# Patient Record
Sex: MALE | Race: WHITE | NOT HISPANIC OR LATINO | Employment: OTHER | URBAN - METROPOLITAN AREA
[De-identification: names, ages, dates, MRNs, and addresses within clinical notes are randomized per-mention and may not be internally consistent; named-entity substitution may affect disease eponyms.]

---

## 2017-12-11 ENCOUNTER — OFFICE VISIT (OUTPATIENT)
Dept: ORTHOPEDIC SURGERY | Facility: CLINIC | Age: 72
End: 2017-12-11

## 2017-12-11 VITALS
HEART RATE: 72 BPM | BODY MASS INDEX: 27.78 KG/M2 | HEIGHT: 71 IN | WEIGHT: 198.41 LBS | SYSTOLIC BLOOD PRESSURE: 127 MMHG | DIASTOLIC BLOOD PRESSURE: 80 MMHG

## 2017-12-11 DIAGNOSIS — S83.412A SPRAIN OF MEDIAL COLLATERAL LIGAMENT OF LEFT KNEE, INITIAL ENCOUNTER: ICD-10-CM

## 2017-12-11 DIAGNOSIS — R52 PAIN: Primary | ICD-10-CM

## 2017-12-11 DIAGNOSIS — M17.32 POST-TRAUMATIC OSTEOARTHRITIS OF LEFT KNEE: ICD-10-CM

## 2017-12-11 DIAGNOSIS — S46.012A RUPTURE OF LEFT SUPRASPINATUS TENDON, INITIAL ENCOUNTER: ICD-10-CM

## 2017-12-11 PROCEDURE — 99204 OFFICE O/P NEW MOD 45 MIN: CPT | Performed by: ORTHOPAEDIC SURGERY

## 2017-12-11 RX ORDER — LISINOPRIL AND HYDROCHLOROTHIAZIDE 12.5; 1 MG/1; MG/1
TABLET ORAL
Refills: 0 | COMMUNITY
Start: 2017-09-19 | End: 2020-09-01

## 2017-12-11 RX ORDER — TRAZODONE HYDROCHLORIDE 100 MG/1
300 TABLET ORAL NIGHTLY
Refills: 1 | COMMUNITY
Start: 2017-11-27

## 2017-12-11 RX ORDER — ACYCLOVIR 200 MG/1
CAPSULE ORAL
Refills: 2 | COMMUNITY
Start: 2017-11-27 | End: 2020-03-20

## 2017-12-11 NOTE — PROGRESS NOTES
[facility] [place] [facility] [place]    Subjective     Chief Complaint   Patient presents with   • Left Shoulder - Pain        HPI      Nicholas Post is a 72 y.o. male.  He complains of left shoulder and left knee pain.  Left shoulder is her for years.  He had an MRI 3 years ago until we had a rotator cuff tear.  Pain 7-8 out of 10.  It is dull aching throbbing stabbing.  It is worse with motion and better with rest.  His left knee was injured a month ago after a skydiving accident he landed and hurt his knee.  His knee feels loose and stable he denies any previous injury to the knee.    Past Medical History:   Diagnosis Date   • Osteoarthritis       Past Surgical History:   Procedure Laterality Date   • BACK SURGERY      Lumbar   • KNEE SURGERY Left     Arthroscopy      Family History   Problem Relation Age of Onset   • Cancer Mother    • Stroke Mother    • Heart attack Father      Social History     Social History   • Marital status:      Spouse name: N/A   • Number of children: N/A   • Years of education: N/A     Occupational History   • Not on file.     Social History Main Topics   • Smoking status: Never Smoker   • Smokeless tobacco: Never Used   • Alcohol use No   • Drug use: Yes     Special: Marijuana      Comment: occasional   • Sexual activity: Defer     Other Topics Concern   • Not on file     Social History Narrative   • No narrative on file      No current outpatient prescriptions on file prior to visit.     No current facility-administered medications on file prior to visit.       No Known Allergies     The following portions of the patient's history were reviewed and updated as appropriate: allergies, current medications, past family history, past medical history, past social history, past surgical history and problem list.    Review of Systems   Constitutional: Positive for activity change.   HENT: Positive for hearing loss, sinus pressure and tinnitus.    Eyes: Positive for pain.  "  Respiratory: Negative.    Cardiovascular: Negative.    Gastrointestinal: Negative.    Endocrine: Negative.    Genitourinary: Negative.    Musculoskeletal: Positive for arthralgias, back pain, gait problem and joint swelling.   Skin: Negative.    Allergic/Immunologic: Negative.    Neurological: Positive for weakness.   Hematological: Negative.    Psychiatric/Behavioral: The patient is hyperactive.         Objective      Physical Exam  /80  Pulse 72  Ht 181 cm (71.26\")  Wt 90 kg (198 lb 6.6 oz)  BMI 27.47 kg/m2    Body mass index is 27.47 kg/(m^2).        GENERAL APPEARANCE: awake, alert & oriented x 3, in no acute distress and well developed, well nourished  PSYCH: normal mood andaffect  LUNGS:  breathing nonlabored, no wheezing  EYES: sclera anicteric, pupils equal  CARDIOVASCULAR: palpable pulses dorsalis pedis, palpable posterior tibial bilaterally. Capillary refill less than 2 seconds  INTEGUMENTARY: skin intact, no clubbing, cyanosis  NEUROLOGIC:  Normal gait and balance            Ortho Exam  Musculoskeletal   Upper Extremity   Left Shoulder     Inspection and Palpation:     Tenderness - none     Crepitus - none    Sensation is normal    Examination reveals no ecchymosis.      Strength and Tone:    Supraspinatus - 5/5    Infraspinatus - 5/5    Subscapularis - 5/5    Deltoid - 5/5   Range of Motion   Right shoulder:    Internal Rotation: ROM - T7    External Rotation: AROM - 80 degrees    Elevation through flexion: AROM - 180 degrees     Abduction - 180   Left Shoulder:    Internal Rotation: ROM - T7    External Rotation: AROM - 80 degrees    Elevation through flexion: AROM - 180 degrees     Abduction - 180 degrees   Instability   Left shoulder    Sulcus sign negative    Apprehension test negative    Maik relocation test negative    Jerk test negative   Impingement   Left shoulder    Ramos impingement test positive    Neer impingement test positive   Functional Testing   Left shoulder    AC " crossover adduction test negative    Abdominal compression test negative    Lift-off sign negative    Speed's test negative    Valdez's test negative    Horriblower's sign negative Peripheral Vascular:    Upper Extremity:   Inspection:  Left--no cyanotic nail beds Right--no cyanotic nail beds   Bilateral:  Pink nail beds with brisk capillary refill   Palpation:  Bilateral radial pulse normal    Musculoskeletal:  Global Assessment:  Overall assessment of Lower Extremity Muscle Strength and Tone:  Left quadriceps--5/5   Left hamstrings--5/5       Left tibialis anterior--5/5  Left gastroc-soleus--5/5  Left EHL--5/5      Lower Extremity:  Knee/Patella:  No digital clubbing or cyanosis.    Examination of left knee reveals:  Normal deep tendon reflexes, coordination, strength, tone, sensation.  No known fractures or deformities.    Inspection and Palpation:    Left knee:  Tenderness:  none  Effusion:  none  Crepitus:  none  Pulses:  2+  Ecchymosis:  None  Warmth:  None       ROM:  Right:  Extension:0    Flexion:135  Left:  Extension:0     Flexion:135    Instability:    Left:  Lachman Test:  positive, Varus stress test negative, Valgus stress test positive   Anterior Drawer Test:  Negative, Posterior Drawer Test:  Negative      Deformities/Malalignments/Discrepancies:    Left:  none  Right:  none    Functional Testing:    Left:  Federico's test:  Negative  Patella grind test:  Negative  Q-angle:  Normal  Apprehension Sign:  Negative    Imaging/Studies  Imaging Results (last 24 hours)     Procedure Component Value Units Date/Time    XR Shoulder 2+ View Left [[id number]] Resulted:  12/11/17 1022     Updated:  12/11/17 1022    Narrative:       Left Shoulder X-Ray  Indication: Pain  AP, scapular Y, and axillary lateral views    Findings:  No fracture  No bony lesion  Normal soft tissues  Normal joint spaces    No prior studies were available for comparison.        XR Knee 4+ View Left [[id number]] Resulted:  12/11/17 1022      Updated:  12/11/17 1023    Narrative:       Knee X-Ray  Indication: Pain    Upright AP of bilateral knees. Lateral, skiers and Sunrise views of left   knee     Findings: Significant heterotopic ossification about the MCL off the   medial femoral condyle.  Bone-on-bone and lateral compartment.  With   significant degenerative changes  No fracture  Normal soft tissues  Normal joint spaces    No prior studies were available for comparison.              Assessment/Plan      Nicholas was seen today for pain.    Diagnoses and all orders for this visit:    Pain  -     XR Shoulder 2+ View Left  -     XR Knee 4+ View Left  -     Ambulatory Referral to Physical Therapy    Post-traumatic osteoarthritis of left knee    Sprain of medial collateral ligament of left knee, initial encounter    Rupture of left supraspinatus tendon, initial encounter        I'll get an MRI left shoulder.  He will continue his brace left knee and we will do physical therapy left knee.  He'll follow-up after MRI left shoulder to evaluate his rotator cuff tear.    Medical Decision Making  Management Options : over-the-counter medicine and physical/occupational therapy  Data/Risk: radiology tests and independent visualization of imaging, lab tests, or EMG/NCV    Stephan Amado MD  12/11/17  10:35 AM

## 2017-12-20 DIAGNOSIS — M75.102 SUPRASPINATUS SYNDROME OF LEFT SHOULDER: Primary | ICD-10-CM

## 2017-12-22 ENCOUNTER — HOSPITAL ENCOUNTER (OUTPATIENT)
Dept: MRI IMAGING | Facility: HOSPITAL | Age: 72
Discharge: HOME | End: 2017-12-22
Attending: ORTHOPAEDIC SURGERY | Admitting: ORTHOPAEDIC SURGERY

## 2017-12-22 DIAGNOSIS — M75.102 SUPRASPINATUS SYNDROME OF LEFT SHOULDER: ICD-10-CM

## 2017-12-22 PROCEDURE — 73221 MRI JOINT UPR EXTREM W/O DYE: CPT

## 2018-01-03 ENCOUNTER — OFFICE VISIT (OUTPATIENT)
Dept: ORTHOPEDIC SURGERY | Facility: CLINIC | Age: 73
End: 2018-01-03

## 2018-01-03 VITALS
BODY MASS INDEX: 27.78 KG/M2 | HEART RATE: 72 BPM | SYSTOLIC BLOOD PRESSURE: 132 MMHG | HEIGHT: 71 IN | WEIGHT: 198.41 LBS | DIASTOLIC BLOOD PRESSURE: 95 MMHG

## 2018-01-03 DIAGNOSIS — S46.012D RUPTURE OF LEFT SUPRASPINATUS TENDON, SUBSEQUENT ENCOUNTER: ICD-10-CM

## 2018-01-03 DIAGNOSIS — M17.32 POST-TRAUMATIC OSTEOARTHRITIS OF LEFT KNEE: Primary | ICD-10-CM

## 2018-01-03 DIAGNOSIS — S83.412D SPRAIN OF MEDIAL COLLATERAL LIGAMENT OF LEFT KNEE, SUBSEQUENT ENCOUNTER: ICD-10-CM

## 2018-01-03 PROCEDURE — 99214 OFFICE O/P EST MOD 30 MIN: CPT | Performed by: ORTHOPAEDIC SURGERY

## 2018-01-03 RX ORDER — DEXTROAMPHETAMINE SACCHARATE, AMPHETAMINE ASPARTATE, DEXTROAMPHETAMINE SULFATE AND AMPHETAMINE SULFATE 5; 5; 5; 5 MG/1; MG/1; MG/1; MG/1
TABLET ORAL
Refills: 0 | COMMUNITY
Start: 2017-12-11 | End: 2020-03-20

## 2018-01-03 NOTE — PROGRESS NOTES
[facility] – [place] [facility] Note    Subjective     Chief Complaint   Patient presents with   • Left Shoulder - Follow-up        HPI      Nicholas Post is a 72 y.o. male.  He is follow-up after MRI left shoulder. He complains of left shoulder and left knee pain.  Left shoulder has hurt for years.  He had an MRI 3 years ago until we had a rotator cuff tear.  Pain 7-8 out of 10.  It is dull aching throbbing stabbing.  It is worse with motion and better with rest.  His left knee was injured a month ago after a skydiving accident he landed and hurt his knee.  His knee feels loose and unstable he denies any previous injury to the knee.  He says his knee is not getting better with the brace he was given last visit.        Past Medical History:   Diagnosis Date   • Osteoarthritis       Past Surgical History:   Procedure Laterality Date   • BACK SURGERY      Lumbar   • KNEE SURGERY Left     Arthroscopy      Family History   Problem Relation Age of Onset   • Cancer Mother    • Stroke Mother    • Heart attack Father      Social History     Social History   • Marital status:      Spouse name: N/A   • Number of children: N/A   • Years of education: N/A     Occupational History   • Not on file.     Social History Main Topics   • Smoking status: Never Smoker   • Smokeless tobacco: Never Used   • Alcohol use No   • Drug use: Yes     Special: Marijuana      Comment: occasional   • Sexual activity: Defer     Other Topics Concern   • Not on file     Social History Narrative      Current Outpatient Prescriptions on File Prior to Visit   Medication Sig Dispense Refill   • acyclovir (ZOVIRAX) 200 MG capsule TK 1 C PO 3 TO 5 TIMES A DAY PRN  2   • lisinopril-hydrochlorothiazide (PRINZIDE,ZESTORETIC) 10-12.5 MG per tablet TK 1 T PO QD  0   • traZODone (DESYREL) 100 MG tablet TK 1 TO 2 TS PO HS  1     No current facility-administered medications on file prior to visit.       No Known Allergies     The following portions  "of the patient's history were reviewed and updated as appropriate: allergies, current medications, past family history, past medical history, past social history, past surgical history and problem list.    Review of Systems   Constitutional: Negative.    HENT: Negative.    Eyes: Negative.    Respiratory: Negative.    Cardiovascular: Negative.    Gastrointestinal: Negative.    Endocrine: Negative.    Genitourinary: Negative.    Musculoskeletal: Positive for arthralgias.   Skin: Negative.    Allergic/Immunologic: Negative.    Neurological: Negative.    Hematological: Negative.    Psychiatric/Behavioral: Negative.         Objective      Physical Exam  /95  Pulse 72  Ht 181 cm (71.26\")  Wt 90 kg (198 lb 6.6 oz)  BMI 27.47 kg/m2    Body mass index is 27.47 kg/(m^2).        GENERAL APPEARANCE: awake, alert & oriented x 3, in no acute distress and well developed, well nourished  PSYCH: normal mood andaffect  LUNGS:  breathing nonlabored, no wheezing  EYES: sclera anicteric, pupils equal  CARDIOVASCULAR: palpable pulses dorsalis pedis, palpable posterior tibial bilaterally. Capillary refill less than 2 seconds  INTEGUMENTARY: skin intact, no clubbing, cyanosis  NEUROLOGIC:  Normal gait and balance            Ortho Exam  Musculoskeletal   Upper Extremity   Left Shoulder     Inspection and Palpation:     Tenderness - none     Crepitus - none    Sensation is normal    Examination reveals no ecchymosis.      Strength and Tone:    Supraspinatus - 5/5    Infraspinatus - 5/5    Subscapularis - 5/5    Deltoid - 5/5   Range of Motion   Right shoulder:    Internal Rotation: ROM - T7    External Rotation: AROM - 80 degrees    Elevation through flexion: AROM - 180 degrees     Abduction - 180   Left Shoulder:    Internal Rotation: ROM - T7    External Rotation: AROM - 80 degrees    Elevation through flexion: AROM - 180 degrees     Abduction - 180 degrees   Instability   Left shoulder    Sulcus sign negative    Apprehension " test negative    Maik relocation test negative    Jerk test negative   Impingement   Left shoulder    Ramos impingement test positive    Neer impingement test positive   Functional Testing   Left shoulder    AC crossover adduction test negative    Abdominal compression test negative    Lift-off sign negative    Speed's test negative    Valdez's test negative    Horriblower's sign negative Peripheral Vascular:    Upper Extremity:   Inspection:  Left--no cyanotic nail beds Right--no cyanotic nail beds   Bilateral:  Pink nail beds with brisk capillary refill   Palpation:  Bilateral radial pulse normal    Musculoskeletal:  Global Assessment:  Overall assessment of Lower Extremity Muscle Strength and Tone:  Left quadriceps--5/5   Left hamstrings--5/5       Left tibialis anterior--5/5  Left gastroc-soleus--5/5  Left EHL--5/5      Lower Extremity:  Knee/Patella:  No digital clubbing or cyanosis.    Examination of left knee reveals:  Normal deep tendon reflexes, coordination, strength, tone, sensation.  No known fractures or deformities.    Inspection and Palpation:    Left knee:  Tenderness:  none  Effusion:  none  Crepitus:  none  Pulses:  2+  Ecchymosis:  None  Warmth:  None       ROM:  Right:  Extension:0    Flexion:135  Left:  Extension:0     Flexion:135    Instability:    Left:  Lachman Test:  Negative, Varus stress test negative, Valgus stress test positive   Anterior Drawer Test:  Negative, Posterior Drawer Test:  Negative      Deformities/Malalignments/Discrepancies:    Left:  none  Right:  none    Functional Testing:    Left:  Federico's test:  Negative  Patella grind test:  Negative  Q-angle:  Normal  Apprehension Sign:  Negative    Imaging/Studies  Imaging Results (last 24 hours)     ** No results found for the last 24 hours. **        I reviewed his MRI which shows a massive retracted tear of the supraspinatus tendon.  He also has arthritis and before meals joint arthropathy.  Assessment/Plan      Nicholas was  seen today for follow-up.    Diagnoses and all orders for this visit:    Post-traumatic osteoarthritis of left knee  -     MRI Knee Left Without Contrast; Future    Rupture of left supraspinatus tendon, subsequent encounter  -     MRI Knee Left Without Contrast; Future    Sprain of medial collateral ligament of left knee, subsequent encounter  -     MRI Knee Left Without Contrast; Future      I recommend left shoulder arthroscopy with rotator cuff repair.  Treatment options and alternatives were discussed with patient.  Surgical versus non surgical treatment options were discussed as well.    We reviewed the nature of the planned procedure including the risks, benefits and potential complications.  Understanding was expressed and the decision was made to proceed with surgery.    I will get an MRI of the left knee to evaluate that and he will continue his brace in the meantime.  Depending on what the knee MRI shows he may want to get surgery before the shoulder surgery.  I will try to see him back after the knee MRI before surgery on the shoulder.  Medical Decision Making  Management Options : over-the-counter medicine and major surgery with risk factors  Data/Risk: radiology tests and independent visualization of imaging, lab tests, or EMG/NCV    Stephan Amado MD  01/03/18  9:18 AM

## 2018-01-08 ENCOUNTER — HOSPITAL ENCOUNTER (OUTPATIENT)
Dept: MRI IMAGING | Facility: HOSPITAL | Age: 73
Discharge: HOME | End: 2018-01-08
Attending: ORTHOPAEDIC SURGERY | Admitting: ORTHOPAEDIC SURGERY

## 2018-01-08 DIAGNOSIS — M17.32 POST-TRAUMATIC OSTEOARTHRITIS OF LEFT KNEE: ICD-10-CM

## 2018-01-08 DIAGNOSIS — S46.012D RUPTURE OF LEFT SUPRASPINATUS TENDON, SUBSEQUENT ENCOUNTER: ICD-10-CM

## 2018-01-08 DIAGNOSIS — S83.412D SPRAIN OF MEDIAL COLLATERAL LIGAMENT OF LEFT KNEE, SUBSEQUENT ENCOUNTER: ICD-10-CM

## 2018-01-08 PROCEDURE — 73721 MRI JNT OF LWR EXTRE W/O DYE: CPT

## 2018-01-10 ENCOUNTER — OFFICE VISIT (OUTPATIENT)
Dept: ORTHOPEDIC SURGERY | Facility: CLINIC | Age: 73
End: 2018-01-10

## 2018-01-10 VITALS
HEIGHT: 71 IN | DIASTOLIC BLOOD PRESSURE: 85 MMHG | WEIGHT: 191.8 LBS | HEART RATE: 82 BPM | SYSTOLIC BLOOD PRESSURE: 138 MMHG | BODY MASS INDEX: 26.85 KG/M2

## 2018-01-10 DIAGNOSIS — M17.32 POST-TRAUMATIC OSTEOARTHRITIS OF LEFT KNEE: ICD-10-CM

## 2018-01-10 DIAGNOSIS — M17.12 PRIMARY OSTEOARTHRITIS OF LEFT KNEE: Primary | ICD-10-CM

## 2018-01-10 DIAGNOSIS — S46.012D RUPTURE OF LEFT SUPRASPINATUS TENDON, SUBSEQUENT ENCOUNTER: ICD-10-CM

## 2018-01-10 PROCEDURE — 99213 OFFICE O/P EST LOW 20 MIN: CPT | Performed by: ORTHOPAEDIC SURGERY

## 2018-01-10 PROCEDURE — 20610 DRAIN/INJ JOINT/BURSA W/O US: CPT | Performed by: ORTHOPAEDIC SURGERY

## 2018-01-10 RX ORDER — BETAMETHASONE SODIUM PHOSPHATE AND BETAMETHASONE ACETATE 3; 3 MG/ML; MG/ML
6 INJECTION, SUSPENSION INTRA-ARTICULAR; INTRALESIONAL; INTRAMUSCULAR; SOFT TISSUE
Status: COMPLETED | OUTPATIENT
Start: 2018-01-10 | End: 2018-01-10

## 2018-01-10 RX ORDER — LIDOCAINE HYDROCHLORIDE 10 MG/ML
4 INJECTION, SOLUTION INFILTRATION; PERINEURAL
Status: COMPLETED | OUTPATIENT
Start: 2018-01-10 | End: 2018-01-10

## 2018-01-10 RX ADMIN — BETAMETHASONE SODIUM PHOSPHATE AND BETAMETHASONE ACETATE 6 MG: 3; 3 INJECTION, SUSPENSION INTRA-ARTICULAR; INTRALESIONAL; INTRAMUSCULAR; SOFT TISSUE at 16:41

## 2018-01-10 RX ADMIN — LIDOCAINE HYDROCHLORIDE 4 ML: 10 INJECTION, SOLUTION INFILTRATION; PERINEURAL at 16:41

## 2018-01-10 NOTE — PROGRESS NOTES
Procedure   Large Joint Arthrocentesis  Date/Time: 1/10/2018 4:41 PM  Consent given by: patient  Site marked: site marked  Timeout: Immediately prior to procedure a time out was called to verify the correct patient, procedure, equipment, support staff and site/side marked as required   Supporting Documentation  Indications: pain   Procedure Details  Location: knee - L knee  Needle size: 22 G  Approach: anterolateral (4cc Bupivacaine .25%  NDC [id number]  [facility] [id number]  [id number])  Medications administered: 4 mL lidocaine 1 %; 6 mg betamethasone acetate-betamethasone sodium phosphate 6 (3-3) MG/ML  Patient tolerance: patient tolerated the procedure well with no immediate complications

## 2018-01-10 NOTE — PROGRESS NOTES
[facility] [place] [facility] Note    Subjective     Chief Complaint   Patient presents with   • Left Knee - Follow-up     1 week follow up, MRI Results        HPI      Nicholas Post is a 72 y.o. male.  He states his knee hurts worse than his shoulder.  He went to a stem cell presentation last night and had a lot of questions about that.  His pain is 6 out of 10 and stabbing in the knee.  He's had Synvisc injections but no steroid injections.        Past Medical History:   Diagnosis Date   • Osteoarthritis       Past Surgical History:   Procedure Laterality Date   • BACK SURGERY      Lumbar   • KNEE SURGERY Left     Arthroscopy      Family History   Problem Relation Age of Onset   • Cancer Mother    • Stroke Mother    • Heart attack Father      Social History     Social History   • Marital status:      Spouse name: N/A   • Number of children: N/A   • Years of education: N/A     Occupational History   • Not on file.     Social History Main Topics   • Smoking status: Never Smoker   • Smokeless tobacco: Never Used   • Alcohol use No   • Drug use: Yes     Special: Marijuana      Comment: occasional   • Sexual activity: Defer     Other Topics Concern   • Not on file     Social History Narrative      Current Outpatient Prescriptions on File Prior to Visit   Medication Sig Dispense Refill   • acyclovir (ZOVIRAX) 200 MG capsule TK 1 C PO 3 TO 5 TIMES A DAY PRN  2   • amphetamine-dextroamphetamine (ADDERALL) 20 MG tablet TK 1 T PO TID  0   • lisinopril-hydrochlorothiazide (PRINZIDE,ZESTORETIC) 10-12.5 MG per tablet TK 1 T PO QD  0   • traZODone (DESYREL) 100 MG tablet TK 1 TO 2 TS PO HS  1     No current facility-administered medications on file prior to visit.       No Known Allergies     The following portions of the patient's history were reviewed and updated as appropriate: allergies, current medications, past family history, past medical history, past social history, past surgical history and problem  "list.    Review of Systems   Constitutional: Negative.    HENT: Negative.    Eyes: Negative.    Respiratory: Negative.    Cardiovascular: Negative.    Gastrointestinal: Negative.    Endocrine: Negative.    Genitourinary: Negative.    Musculoskeletal: Positive for arthralgias.   Skin: Negative.    Allergic/Immunologic: Negative.    Neurological: Negative.    Hematological: Negative.    Psychiatric/Behavioral: Negative.         Objective      Physical Exam  /85  Pulse 82  Ht 181 cm (71.26\")  Wt 87 kg (191 lb 12.8 oz)  BMI 26.56 kg/m2    Body mass index is 26.56 kg/(m^2).        GENERAL APPEARANCE: awake, alert & oriented x 3, in no acute distress and well developed, well nourished  PSYCH: normal mood andaffect  LUNGS:  breathing nonlabored, no wheezing  EYES: sclera anicteric, pupils equal  CARDIOVASCULAR: palpable pulses dorsalis pedis, palpable posterior tibial bilaterally. Capillary refill less than 2 seconds  INTEGUMENTARY: skin intact, no clubbing, cyanosis  NEUROLOGIC:  Normal gait and balance            Ortho Exam  Peripheral Vascular:    Upper Extremity:   Inspection:  Left--no cyanotic nail beds Right--no cyanotic nail beds   Bilateral:  Pink nail beds with brisk capillary refill   Palpation:  Bilateral radial pulse normal    Musculoskeletal:  Global Assessment:  Overall assessment of Lower Extremity Muscle Strength and Tone:  Left quadriceps--5/5   Left hamstrings--5/5       Left tibialis anterior--5/5  Left gastroc-soleus--5/5  Left EHL --5/5    Lower Extremity:  Knee/Patella:  No digital clubbing or cyanosis.    Examination of left knee reveals:  Normal deep tendon reflexes, coordination, strength, tone, sensation.  No known fractures or deformities.    Inspection and Palpation:  Left knee:  Tenderness:  Over the medial joint line and moderate severity  Effusion:  none  Crepitus:  Positive  Pulses:  2+  Ecchymosis:  None  Warmth:  None     ROM:  Right:  Extension:5    Flexion:120  Left:  " Extension:5     Flexion:120    Instability:    Left:  Lachman Test:  Negative, Varus stress test negative, Valgus stress test negative    Deformities/Malalignments/Discrepancies:    Left:  Genu Varum   Right:  No deformity    Functional Testing:  Federico's test:  Negative  Patella grind test:  Positive  Q-angle:  normalMusculoskeletal   Upper Extremity   Left Shoulder     Inspection and Palpation:     Tenderness - none     Crepitus - none    Sensation is normal    Examination reveals no ecchymosis.      Strength and Tone:    Supraspinatus - 5/5    Infraspinatus - 5/5    Subscapularis - 5/5    Deltoid - 5/5   Range of Motion   Right shoulder:    Internal Rotation: ROM - T7    External Rotation: AROM - 80 degrees    Elevation through flexion: AROM - 180 degrees     Abduction - 180   Left Shoulder:    Internal Rotation: ROM - T7    External Rotation: AROM - 80 degrees    Elevation through flexion: AROM - 180 degrees     Abduction - 180 degrees   Instability   Left shoulder    Sulcus sign negative    Apprehension test negative    Maik relocation test negative    Jerk test negative   Impingement   Left shoulder    Ramos impingement test positive    Neer impingement test positive   Functional Testing   Left shoulder    AC crossover adduction test negative    Abdominal compression test negative    Lift-off sign negative    Speed's test negative    Valdez's test negative    Horriblower's sign negative     Imaging/Studies  Imaging Results (last 24 hours)     ** No results found for the last 24 hours. **        I reviewed the MRI left knee which show severe arthritis and chronic ACL tear no acute pathology  Assessment/Plan      Nicholas was seen today for follow-up.    Diagnoses and all orders for this visit:    Primary osteoarthritis of left knee  -     Large Joint Arthrocentesis    Post-traumatic osteoarthritis of left knee    Rupture of left supraspinatus tendon, subsequent encounter      I gave him a steroid injection  left knee today.  He tolerated the injection well.convocation.  He'll follow-up in 3 weeks.  If not better consider total knee arthroplasty.  Eventually he will need a left shoulder rotator cuff repair.    Medical Decision Making  Management Options : over-the-counter medicine and prescription/IM medicine  Data/Risk: radiology tests and independent visualization of imaging, lab tests, or EMG/NCV    Stephan Amado MD  01/10/18  4:53 PM

## 2018-01-22 ENCOUNTER — TELEPHONE (OUTPATIENT)
Dept: ORTHOPEDIC SURGERY | Facility: CLINIC | Age: 73
End: 2018-01-22

## 2018-01-23 ENCOUNTER — TELEPHONE (OUTPATIENT)
Dept: ORTHOPEDIC SURGERY | Facility: CLINIC | Age: 73
End: 2018-01-23

## 2018-01-23 NOTE — TELEPHONE ENCOUNTER
----- Message from Racquel Lee sent at 1/19/2018  2:13 PM EST -----  Regarding: R/S SURGERY  Patient would like to r/s his surgery sometime after 2/14. Please return his call at [phone]. Thanks!

## 2018-02-15 ENCOUNTER — OUTSIDE FACILITY SERVICE (OUTPATIENT)
Dept: ORTHOPEDIC SURGERY | Facility: CLINIC | Age: 73
End: 2018-02-15

## 2018-02-15 PROCEDURE — 29828 SHO ARTHRS SRG BICP TENODSIS: CPT | Performed by: ORTHOPAEDIC SURGERY

## 2018-02-15 PROCEDURE — 29827 SHO ARTHRS SRG RT8TR CUF RPR: CPT | Performed by: ORTHOPAEDIC SURGERY

## 2018-02-21 ENCOUNTER — OFFICE VISIT (OUTPATIENT)
Dept: ORTHOPEDIC SURGERY | Facility: CLINIC | Age: 73
End: 2018-02-21

## 2018-02-21 DIAGNOSIS — Z98.890 STATUS POST ARTHROSCOPY OF SHOULDER: ICD-10-CM

## 2018-02-21 DIAGNOSIS — S46.012D RUPTURE OF LEFT SUPRASPINATUS TENDON, SUBSEQUENT ENCOUNTER: Primary | ICD-10-CM

## 2018-02-21 PROCEDURE — 99024 POSTOP FOLLOW-UP VISIT: CPT | Performed by: ORTHOPAEDIC SURGERY

## 2018-02-21 RX ORDER — HYDROCODONE BITARTRATE AND ACETAMINOPHEN 7.5; 325 MG/1; MG/1
TABLET ORAL
Refills: 0 | COMMUNITY
Start: 2018-02-15 | End: 2020-03-20

## 2018-02-21 RX ORDER — PROMETHAZINE HYDROCHLORIDE 25 MG/1
TABLET ORAL
Refills: 0 | COMMUNITY
Start: 2018-02-15 | End: 2020-03-20

## 2018-02-21 NOTE — PROGRESS NOTES
Chief Complaint   Patient presents with   • Post-op     1 week s/p (L) shoulder arthroscopy with RCR, biceps tenodesis           HPI    He is doing well no complaints.    There were no vitals filed for this visit.      Physical Exam:  His shoulder portals look good he is neurovascular intact.      Nicholas was seen today for post-op.    Diagnoses and all orders for this visit:    Rupture of left supraspinatus tendon, subsequent encounter    Status post arthroscopy of shoulder    He will follow-up in 3 weeks to start physical therapy.

## 2018-03-16 ENCOUNTER — OFFICE VISIT (OUTPATIENT)
Dept: ORTHOPEDIC SURGERY | Facility: CLINIC | Age: 73
End: 2018-03-16

## 2018-03-16 DIAGNOSIS — Z98.890 STATUS POST LEFT ROTATOR CUFF REPAIR: Primary | ICD-10-CM

## 2018-03-16 DIAGNOSIS — Z91.199 NONCOMPLIANCE: ICD-10-CM

## 2018-03-16 PROCEDURE — 99024 POSTOP FOLLOW-UP VISIT: CPT | Performed by: ORTHOPAEDIC SURGERY

## 2018-03-16 NOTE — PROGRESS NOTES
Chief Complaint   Patient presents with   • Post-op Follow-up     4 weeks s/p (L) shoulder arthroscopy with RCR, biceps tenodesis 02/16/2018           HPI    He is follow-up rotator cuff repair.  States he has pain.  He is very re-tore something.  He is not wearing his sling.  He drove following [place] and back.  It appears he has not been compliant with using his sling and has been trying to her shoulder.    There were no vitals filed for this visit.      Physical Exam:  His left shoulder looks good with no erythema.  There is minimal swelling.  He is neurovascular intact.        ICD-10-CM ICD-9-CM   1. Status post left rotator cuff repair Z98.890 V45.89   2. Noncompliance Z91.19 V15.81     I have ordered physical therapy.  He needs start range of motion.  No strengthening.  He'll follow-up in one month.

## 2018-03-22 ENCOUNTER — TELEPHONE (OUTPATIENT)
Dept: ORTHOPEDIC SURGERY | Facility: CLINIC | Age: 73
End: 2018-03-22

## 2018-03-22 NOTE — TELEPHONE ENCOUNTER
He is 5 weeks out left shoulder arthroscopy.  Refill pain meds? Send in anti-inflammatory?  Please advise! Thanks!  Melanie

## 2018-03-22 NOTE — TELEPHONE ENCOUNTER
PT IS ASKING IF AN ANTI INFLAMMATORY CAN BE SENT TO [facility] IN [place] FOR HIS KNEE?     HE'S ALSO ASKING FOR A WEEK SUPPLY OF PAIN MEDICATION FOR HIS SHOULDER.

## 2018-03-23 RX ORDER — NAPROXEN 500 MG/1
500 TABLET ORAL 2 TIMES DAILY
Qty: 180 TABLET | Refills: 3 | Status: SHIPPED | OUTPATIENT
Start: 2018-03-23 | End: 2020-03-20

## 2018-03-27 ENCOUNTER — HOSPITAL ENCOUNTER (OUTPATIENT)
Dept: PHYSICAL THERAPY | Facility: HOSPITAL | Age: 73
Setting detail: THERAPIES SERIES
Discharge: HOME | End: 2018-03-27

## 2018-03-27 DIAGNOSIS — R68.89 IMPAIRED FUNCTION OF UPPER EXTREMITY: ICD-10-CM

## 2018-03-27 DIAGNOSIS — Z98.890 S/P LEFT ROTATOR CUFF REPAIR: Primary | ICD-10-CM

## 2018-03-27 PROCEDURE — G8985 CARRY GOAL STATUS: HCPCS | Performed by: PHYSICAL THERAPIST

## 2018-03-27 PROCEDURE — G8984 CARRY CURRENT STATUS: HCPCS | Performed by: PHYSICAL THERAPIST

## 2018-03-27 PROCEDURE — 97161 PT EVAL LOW COMPLEX 20 MIN: CPT | Performed by: PHYSICAL THERAPIST

## 2018-03-27 NOTE — THERAPY EVALUATION
"    Outpatient Physical Therapy Ortho Initial Evaluation  [facility]     Patient Name: Nicholas Post  : 1945  MRN: [id number]  Today's Date: 3/27/2018      Visit Date: 2018    There is no problem list on file for this patient.       Past Medical History:   Diagnosis Date   • Osteoarthritis         Past Surgical History:   Procedure Laterality Date   • BACK SURGERY      Lumbar   • KNEE SURGERY Left     Arthroscopy       Visit Dx:     ICD-10-CM ICD-9-CM   1. S/P left rotator cuff repair Z98.890 V45.89   2. Impaired function of upper extremity R68.89 V49.5             Patient History     Row Name 18 1400             History    Chief Complaint Difficulty with daily activities;Muscle weakness;Pain  -RB      Type of Pain Shoulder pain  -RB      Brief Description of Current Complaint Longstanding B shoulder pn. Had MRI showing RTC tear. Underwent RTC repair on 2/15/18. Says he was unable to wear his sling more than a few days 2/2 discomfort and has been using his arm a lot since surgery \"because he has had no choice.\" Reports having driven 2800 miles from [place] using his left arm and had significant pain for several weeks. Over the past few days pn seems to have eased and he is able to sleep w/ less difficulty. He continues to be limited w/ overhead reaching, lifting/carrying items, dressing, bathing, performing hygiene, etc.   -RB      Previous treatment for THIS PROBLEM Surgery  -RB      Surgery Date: 02/15/18  -RB      Current Tobacco Use None  -RB      Patient's Rating of General Health Good  -RB      Hand Dominance right-handed  -RB      Occupation/sports/leisure activities Retired, enjoys [employer].   -RB      Patient seeing anyone else for problem(s)? Yes  -RB      How has patient tried to help current problem? medication  -RB         Pain     Pain Location Shoulder  -RB      Pain at Present 4  -RB      Pain at Best 3  -RB      Pain at Worst 4  -RB      Pain Frequency Constant/continuous  " -RB      Pain Description Aching;Burning;Dull  -RB      What Performance Factors Make the Current Problem(s) WORSE? moving the shoulder, lifting/carrying  -RB      What Performance Factors Make the Current Problem(s) BETTER? rest  -RB         Fall Risk Assessment    Any falls in the past year: Yes  -RB      Number of falls reported in the last 12 months multiple  -RB      Factors that contributed to the fall: Lost balance;Tripped   L knee weakness  -RB      Does patient have a fear of falling No  -RB         Daily Activities    Primary Language English  -RB      How does patient learn best? Demonstration  -RB      Teaching needs identified Home Exercise Program;Management of Condition  -RB      Patient is concerned about/has problems with Difficulty with self care (i.e. bathing, dressing, toileting:;Grasping objects lifting;Performing home management (household chores, shopping, care of dependents);Reaching over head;Repetitive movements of the hand, arm, shoulder  -RB      Does patient have problems with the following? Depression  -RB      Barriers to learning None  -RB      Pt Participated in POC and Goals Yes  -RB         Safety    Are you being hurt, hit, or frightened by anyone at home or in your life? No  -RB      Are you being neglected by a caregiver No  -RB        User Key  (r) = Recorded By, (t) = Taken By, (c) = Cosigned By    Initials Name Provider Type    RB Gladys Maxwell, PT Physical Therapist                PT Ortho     Row Name 03/27/18 1400       Posture/Observations    Posture/Observations Comments Pt presents to PT clinic out sling, does not hesitate to attempt raising L arm overhead when describing to PT how he has used it at home.  -RB       Special Tests/Palpation    Special Tests/Palpation Shoulder  -RB       General ROM    LT Upper Ext Lt Shoulder ABduction;Lt Shoulder Extension;Lt Shoulder Flexion;Lt Shoulder External Rotation;Lt Shoulder Internal Rotation;Lt Elbow Extension/Flexion  -RB        Left Upper Ext    Lt Shoulder Abduction AROM 67  -RB    Lt Shoulder Abduction PROM 90  -RB    Lt Shoulder Extension AROM 62  -RB    Lt Shoulder Flexion AROM 82  -RB    Lt Shoulder Flexion PROM 90  -RB    Lt Shoulder External Rotation AROM 37   active assisted  -RB    Lt Shoulder External Rotation PROM 50   w/ shoulder adducted to side  -RB    Lt Shoulder Internal Rotation AROM to abdomen  -RB    Lt Shoulder Internal Rotation PROM deferred  -RB    Lt Elbow Extension/Flexion AROM 0-140  -RB    Lt Upper Extremity Comments  L wrist/forearm ROM WNL  -RB       MMT (Manual Muscle Testing)    Additional Documentation --   formal MMT deferred  -RB      User Key  (r) = Recorded By, (t) = Taken By, (c) = Cosigned By    Initials Name Provider Type    RB Gladys Maxwell, PT Physical Therapist                      Therapy Education  Education Details: Issued and practiced initial HEP including table slides into flexion and scaption, pendulums, scap retractions, and active elbow ROM to tolerance. Re-inforced importance of compliance w/ movement and activity restrictions (no active movement overhead, behind back, or out to side, no pushing/pulling, lifting/carrying) to allow for optimal healing. Educated re: use of ice for pn control.  Given: HEP, Symptoms/condition management, Pain management, Mobility training  Program: New  How Provided: Verbal, Demonstration, Written  Provided to: Patient  Level of Understanding: Teach back education performed           PT OP Goals     Row Name 03/27/18 1748 03/27/18 1700       PT Short Term Goals    STG Date to Achieve  -- 04/24/18  -RB    STG 1  -- Pt to be compliant w/ initial HEP for P/AAROM activities, low level scap stab, and pn control.  -RB    STG 1 Progress  -- New  -RB    STG 2  -- Pt to demo full PROM in all planes w/ minimal to no pn.  -RB    STG 2 Progress  -- New  -RB    STG 3  -- Pt to improve L shoulder AROM to at least 130 deg elevation, 50 deg ER.  -RB    STG 3 Progress  --  New  -RB    STG 4  -- Pt to achieve behind back IR to at least L1 or higher.  -RB    STG 4 Progress  -- New  -RB       Long Term Goals    LTG Date to Achieve  -- 05/22/18  -RB    LTG 1  -- Pt to be independen w/ long term HEP for strengthening and mobility.  -RB    LTG 1 Progress  -- New  -RB    LTG 2  -- Pt to demo functional AROM in all planes w/ minimal to no pn.  -RB    LTG 2 Progress  -- New  -RB    LTG 3  -- Pt to demo L shoulder strength of grossly 4/5 in all planes.  -RB    LTG 3 Progress  -- New  -RB    LTG 4  -- Pt to improve QD score by at least 25% to reflect improved pn and function.  -RB    LTG 4 Progress  -- New  -RB       Time Calculation    PT Goal Re-Cert Due Date 06/25/18  -RB 06/25/18  -RB      User Key  (r) = Recorded By, (t) = Taken By, (c) = Cosigned By    Initials Name Provider Type    RB Gladys Maxwell, PT Physical Therapist                PT Assessment/Plan     Row Name 03/27/18 1743          PT Assessment    Functional Limitations Decreased safety during functional activities;Limitation in home management;Limitations in functional capacity and performance;Performance in leisure activities;Performance in self-care ADL;Limitations in community activities  -RB     Impairments Impaired flexibility;Joint mobility;Pain;Muscle strength;Range of motion;Impaired muscle endurance;Impaired muscle power  -RB     Assessment Comments Pt presents approx 6 wks s/p arthroscopic rotator cuff repair and bicep tenodesis. He reports non compliance w/ ROM and activity restrictions since surgery. He demonstrates significant deficits in A/PROM, joint mobility, and function. Deferred formal strength testing today due to length of time since surgery. Pt would benefit from skilled PT services to address deficits, improve strength and mobility, and maximize function.  -RB     Please refer to paper survey for additional self-reported information Yes  -RB     Rehab Potential Fair  -RB     Patient/caregiver participated  in establishment of treatment plan and goals Yes  -RB     Patient would benefit from skilled therapy intervention Yes  -RB        PT Plan    PT Frequency 2x/week  -RB     Predicted Duration of Therapy Intervention (OT Eval) 15-20 visits  -RB     Planned CPT's? PT EVAL LOW COMPLEXITY: 89418;PT RE-EVAL: 91041;PT THER PROC EA 15 MIN: 37317;PT MANUAL THERAPY EA 15 MIN: 31519;PT NEUROMUSC RE-EDUCATION EA 15 MIN: 94732;PT HOT OR COLD PACK TREAT MCARE;PT ELECTRICAL STIM UNATTEND:   -RB     PT Plan Comments PT POC to include ROM, scap stabilization, manual therapy progressing to strengthening and functional training as per protocol.   -RB       User Key  (r) = Recorded By, (t) = Taken By, (c) = Cosigned By    Initials Name Provider Type    RB Gladys Maxwell, PT Physical Therapist                              Outcome Measure Options: Quick DASH  Quick DASH  Open a tight or new jar.: Mild Difficulty  Do heavy household chores (e.g., wash walls, wash floors): Mild Difficulty  Carry a shopping bag or briefcase: Mild Difficulty  Wash your back: Severe Difficulty  Use a knife to cut food: Mild Difficulty  Recreational activities in which you take some force or impact through your arm, should or hand (e.g. golf, hammering, tennis, etc.): Severe Difficulty  During the past week, to what extent has your arm, shoulder, or hand problem interfered with your normal social activites with family, friends, neighbors or groups?: Moderately  During the past week, were you limited in your work or other regular daily activities as a result of your arm, shoulder or hand problem?: Moderately Limited  Arm, Shoulder, or hand pain: Severe  Tingling (pins and needles) in your arm, shoulder, or hand: None  During the past week, how much difficulty have you had sleeping because of the pain in your arm, shoulder or hand?: Severe Difficulty  Number of Questions Answered: 11  Quick DASH Score: 45.45         Time Calculation:   Start Time: 7382      Therapy Charges for Today     Code Description Service Date Service Provider Modifiers Qty    26885299430 HC PT CARRY MOV HAND OBJ CURRENT 3/27/2018 Gladys Maxwell, PT GP,  1    38109673009 HC PT CARRY MOV HAND OBJ PROJECTED 3/27/2018 Gladys Maxwell, PT GP,  1    [id number] HC PT EVAL LOW COMPLEXITY 4 3/27/2018 Gladys Maxwell, PT GP 1          PT G-Codes  PT Professional Judgement Used?: Yes  Outcome Measure Options: Quick DASH  Score: 45.45%  Functional Limitation: Carrying, moving and handling objects  Carrying, Moving and Handling Objects Current Status (): At least 80 percent but less than 100 percent impaired, limited or restricted  Carrying, Moving and Handling Objects Goal Status (): At least 20 percent but less than 40 percent impaired, limited or restricted         Gladys Maxwell, PT  3/27/2018

## 2018-05-07 ENCOUNTER — DOCUMENTATION (OUTPATIENT)
Dept: PHYSICAL THERAPY | Facility: HOSPITAL | Age: 73
End: 2018-05-07

## 2018-05-07 DIAGNOSIS — Z98.890 S/P LEFT ROTATOR CUFF REPAIR: Primary | ICD-10-CM

## 2018-05-07 DIAGNOSIS — R68.89 IMPAIRED FUNCTION OF UPPER EXTREMITY: ICD-10-CM

## 2018-05-07 NOTE — THERAPY DISCHARGE NOTE
Outpatient Physical Therapy Discharge Summary         Patient Name: Nicholas Post  : 1945  MRN: [id number]    Today's Date: 2018    Visit Dx:    ICD-10-CM ICD-9-CM   1. S/P left rotator cuff repair Z98.890 V45.89   2. Impaired function of upper extremity R68.89 V49.5             PT OP Goals     Row Name 18 1600          PT Short Term Goals    STG Date to Achieve 18  -RB     STG 1 Pt to be compliant w/ initial HEP for P/AAROM activities, low level scap stab, and pn control.  -RB     STG 1 Progress Not Met  -RB     STG 2 Pt to demo full PROM in all planes w/ minimal to no pn.  -RB     STG 2 Progress Not Met  -RB     STG 3 Pt to improve L shoulder AROM to at least 130 deg elevation, 50 deg ER.  -RB     STG 3 Progress Not Met  -RB     STG 4 Pt to achieve behind back IR to at least L1 or higher.  -RB     STG 4 Progress Not Met  -RB        Long Term Goals    LTG Date to Achieve 18  -RB     LTG 1 Pt to be independen w/ long term HEP for strengthening and mobility.  -RB     LTG 1 Progress Not Met  -RB     LTG 2 Pt to demo functional AROM in all planes w/ minimal to no pn.  -RB     LTG 2 Progress Not Met  -RB     LTG 3 Pt to demo L shoulder strength of grossly 4/5 in all planes.  -RB     LTG 3 Progress Not Met  -RB     LTG 4 Pt to improve QD score by at least 25% to reflect improved pn and function.  -RB     LTG 4 Progress Not Met  -RB       User Key  (r) = Recorded By, (t) = Taken By, (c) = Cosigned By    Initials Name Provider Type    NAVARRO Maxwell, PT Physical Therapist          OP PT Discharge Summary  Date of Discharge: 18  Reason for Discharge: Non-compliant  Outcomes Achieved: Unable to make functional progress toward goals at this time  Discharge Destination: Home without follow-up  Discharge Instructions/Additional Comments: Pt presented for initial evaluation only, failed to return for follow up visits.      Time Calculation:            PT G-Codes  PT Professional  Judgement Used?: Yes  Functional Limitation: Carrying, moving and handling objects  Carrying, Moving and Handling Objects Goal Status (): At least 20 percent but less than 40 percent impaired, limited or restricted  Carrying, Moving and Handling Objects Discharge Status (): At least 80 percent but less than 100 percent impaired, limited or restricted       Gladys Maxwell, PT  5/7/2018

## 2018-06-05 ENCOUNTER — TELEPHONE (OUTPATIENT)
Dept: ORTHOPEDIC SURGERY | Facility: CLINIC | Age: 73
End: 2018-06-05

## 2018-06-05 NOTE — TELEPHONE ENCOUNTER
I CALLED PATIENT TO REMIND HIM OF HIS POST OP APPOINTMENT FOR TOMORROW AND HE STATED THAT HE CAN'T AFFORD TO COME FOR ANOTHER POST OP FOLLOW UP'S. HE ALSO STATED THAT HE IS DOING FINE AND WILL CALL IF HE NEEDS TO.

## 2018-06-06 NOTE — TELEPHONE ENCOUNTER
Does he realize that postop visits are no charge for 3 months.  I assume he means he cannot afford the transportation?

## 2020-03-20 ENCOUNTER — OFFICE VISIT (OUTPATIENT)
Dept: NEUROSURGERY | Facility: CLINIC | Age: 75
End: 2020-03-20

## 2020-03-20 VITALS — HEIGHT: 72 IN | BODY MASS INDEX: 26.22 KG/M2 | WEIGHT: 193.6 LBS | TEMPERATURE: 97.8 F

## 2020-03-20 DIAGNOSIS — M47.816 FACET ARTHRITIS OF LUMBAR REGION: ICD-10-CM

## 2020-03-20 DIAGNOSIS — M48.061 SPINAL STENOSIS OF LUMBAR REGION WITHOUT NEUROGENIC CLAUDICATION: ICD-10-CM

## 2020-03-20 DIAGNOSIS — M54.16 LUMBAR RADICULOPATHY: Primary | ICD-10-CM

## 2020-03-20 DIAGNOSIS — M43.16 SPONDYLOLISTHESIS OF LUMBAR REGION: ICD-10-CM

## 2020-03-20 DIAGNOSIS — M51.36 DEGENERATIVE DISC DISEASE, LUMBAR: ICD-10-CM

## 2020-03-20 PROCEDURE — 99203 OFFICE O/P NEW LOW 30 MIN: CPT | Performed by: NEUROLOGICAL SURGERY

## 2020-03-20 RX ORDER — GABAPENTIN 300 MG/1
300 CAPSULE ORAL 3 TIMES DAILY
Qty: 90 CAPSULE | Refills: 1 | OUTPATIENT
Start: 2020-03-20 | End: 2020-06-01 | Stop reason: SDUPTHER

## 2020-03-20 RX ORDER — ACYCLOVIR 200 MG/1
200 CAPSULE ORAL 4 TIMES DAILY PRN
COMMUNITY

## 2020-03-20 NOTE — PROGRESS NOTES
Patient: Nicholas Post  : 1945    Primary Care Provider: Kassandra Manley APRN    Requesting Provider: As above        History    Chief Complaint: Back and right leg pain.    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  He has done well.  Since October he has had low back pain that extends into the right buttock and into the side of the right calf.  He has had some sensory alteration in the right foot.  There is no one position that makes him better or worse.  He did some chiropractic and then saw an orthopedic doctor in [place], [place].  From what I can gather he had a couple of epidural injections performed which were not helpful.  Lifting also makes his symptoms worse.    Review of Systems   Constitutional: Positive for activity change. Negative for appetite change, chills, diaphoresis, fatigue, fever and unexpected weight change.   HENT: Negative for congestion, dental problem, drooling, ear discharge, ear pain, facial swelling, hearing loss, mouth sores, nosebleeds, postnasal drip, rhinorrhea, sinus pressure, sneezing, sore throat, tinnitus, trouble swallowing and voice change.    Eyes: Negative for photophobia, pain, discharge, redness, itching and visual disturbance.   Respiratory: Negative for apnea, cough, choking, chest tightness, shortness of breath, wheezing and stridor.    Cardiovascular: Negative for chest pain, palpitations and leg swelling.   Gastrointestinal: Negative for abdominal distention, abdominal pain, anal bleeding, blood in stool, constipation, diarrhea, nausea, rectal pain and vomiting.   Endocrine: Negative for cold intolerance, heat intolerance, polydipsia, polyphagia and polyuria.   Genitourinary: Positive for penile pain. Negative for decreased urine volume, difficulty urinating, dysuria, enuresis, flank pain, frequency, genital sores, hematuria and urgency.   Musculoskeletal: Positive for back  "pain. Negative for arthralgias, gait problem, joint swelling, myalgias, neck pain and neck stiffness.   Skin: Negative for color change, pallor, rash and wound.   Allergic/Immunologic: Negative for environmental allergies, food allergies and immunocompromised state.   Neurological: Positive for weakness and numbness. Negative for dizziness, tremors, seizures, syncope, facial asymmetry, speech difficulty, light-headedness and headaches.   Hematological: Negative for adenopathy. Does not bruise/bleed easily.   Psychiatric/Behavioral: Negative for agitation, behavioral problems, confusion, decreased concentration, dysphoric mood, hallucinations, self-injury, sleep disturbance and suicidal ideas. The patient is not nervous/anxious and is not hyperactive.    All other systems reviewed and are negative.      The patient's past medical history, past surgical history, family history, and social history have been reviewed at length in the electronic medical record.    Physical Exam:   Temp 97.8 °F (36.6 °C) (Temporal)   Ht 182.9 cm (72\")   Wt 87.8 kg (193 lb 9.6 oz)   BMI 26.26 kg/m²   CONSTITUTIONAL: Patient is well-nourished, pleasant and appears stated age.  CV: Heart regular rate and rhythm without murmur, rub, or gallop.  PULMONARY: Lungs are clear to ascultation.  MUSCULOSKELETAL:  Straight leg raising is negative.  Julian's Sign is negative.  ROM in back normal.  Tenderness in the back to palpation is present above the right greater trochanter.  NEUROLOGICAL:  Orientation, memory, attention span, language function, and cognition have been examined and are intact.  Strength is intact in the lower extremities to direct testing.  Muscle tone is normal throughout.  Station and gait are normal.  Sensation is intact to light touch testing throughout.  Deep tendon reflexes are 1+ and symmetrical.  Coordination is intact.      Medical Decision Making    Data Review:   MRI of the lumbar spine dated 2/6/2020 demonstrates  " significant narrowing of the L5-S1 disc space where there may have been a dorsal decompression.  There is a grade 1 listhesis of L4 on L5 with high-grade stenosis.  There may be a small synovial cyst emanating from the facet joints.    Diagnosis:   1.  Lumbar radiculopathy.  2.  L4-5 spondylolisthesis with potential instability.    Treatment Options:   I discussed treatment options with the patient.  He is not amenable to physical therapy.  I prescribed Neurontin.  He will follow-up in several weeks with plain flexion-extension films of his lumbar spine.  If he continues to struggle then we may need to consider lumbar decompression or potential decompression and fusion at L4-5.       Diagnosis Plan   1. Lumbar radiculopathy  gabapentin (NEURONTIN) 300 MG capsule   2. Spondylolisthesis of lumbar region  XR Spine Lumbar Flex & Ext   3. Degenerative disc disease, lumbar     4. Facet arthritis of lumbar region     5. Spinal stenosis of lumbar region without neurogenic claudication             I, Dr. Murillo, personally performed the services described in the documentation, as scribed in my presence, and it is both accurate and complete.

## 2020-04-17 ENCOUNTER — HOSPITAL ENCOUNTER (OUTPATIENT)
Dept: GENERAL RADIOLOGY | Facility: HOSPITAL | Age: 75
Discharge: HOME | End: 2020-04-17
Admitting: NEUROLOGICAL SURGERY

## 2020-04-17 ENCOUNTER — OFFICE VISIT (OUTPATIENT)
Dept: NEUROSURGERY | Facility: CLINIC | Age: 75
End: 2020-04-17

## 2020-04-17 ENCOUNTER — PREP FOR SURGERY (OUTPATIENT)
Dept: OTHER | Facility: HOSPITAL | Age: 75
End: 2020-04-17

## 2020-04-17 VITALS — TEMPERATURE: 98.2 F | HEIGHT: 72 IN | BODY MASS INDEX: 26.28 KG/M2 | WEIGHT: 194 LBS

## 2020-04-17 DIAGNOSIS — M43.16 SPONDYLOLISTHESIS OF LUMBAR REGION: ICD-10-CM

## 2020-04-17 DIAGNOSIS — M54.16 LUMBAR RADICULOPATHY: Primary | ICD-10-CM

## 2020-04-17 DIAGNOSIS — M43.10 SPONDYLOLISTHESIS, ACQUIRED: Primary | ICD-10-CM

## 2020-04-17 PROCEDURE — 99213 OFFICE O/P EST LOW 20 MIN: CPT | Performed by: NEUROLOGICAL SURGERY

## 2020-04-17 PROCEDURE — 72120 X-RAY BEND ONLY L-S SPINE: CPT

## 2020-04-17 RX ORDER — ACETAMINOPHEN 500 MG
1000 TABLET ORAL ONCE
Status: CANCELLED | OUTPATIENT
Start: 2020-04-17 | End: 2020-04-17

## 2020-04-17 RX ORDER — CEFAZOLIN SODIUM 2 G/100ML
2 INJECTION, SOLUTION INTRAVENOUS ONCE
Status: CANCELLED | OUTPATIENT
Start: 2020-04-17 | End: 2020-04-17

## 2020-04-17 RX ORDER — IBUPROFEN 800 MG/1
800 TABLET ORAL ONCE
Status: CANCELLED | OUTPATIENT
Start: 2020-04-17 | End: 2020-04-17

## 2020-04-17 RX ORDER — HYDROCODONE BITARTRATE AND ACETAMINOPHEN 10; 325 MG/1; MG/1
1 TABLET ORAL 2 TIMES DAILY PRN
Qty: 50 TABLET | Refills: 0 | Status: SHIPPED | OUTPATIENT
Start: 2020-04-17 | End: 2020-06-01 | Stop reason: SDUPTHER

## 2020-04-17 RX ORDER — FAMOTIDINE 20 MG/1
20 TABLET, FILM COATED ORAL
Status: CANCELLED | OUTPATIENT
Start: 2020-04-17

## 2020-04-17 RX ORDER — OXYCODONE HCL 10 MG/1
10 TABLET, FILM COATED, EXTENDED RELEASE ORAL ONCE
Status: CANCELLED | OUTPATIENT
Start: 2020-04-17 | End: 2020-04-17

## 2020-04-17 NOTE — PROGRESS NOTES
Patient: Nicholas Post  : 1945    Primary Care Provider: Kassandra Manley APRN    Requesting Provider: As above        History    Chief Complaint: Back and right leg pain.    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  He has done well.  Since October he has had low back pain that extends into the right buttock and into the side of the right calf.  He has had some sensory alteration in the right foot.  He is now also complaining of some tingling in the side of his ankle on the right. There is no one position that makes him better or worse.  He did some chiropractic and then saw an orthopedic doctor in [place], [place].  From what I can gather he had a couple of epidural injections performed which were not helpful.  Lifting also makes his symptoms worse.  Neurontin has not been particularly helpful.  He has been taking that regularly.  He was unable to get his x-rays.    Review of Systems   Constitutional: Positive for activity change. Negative for appetite change, chills, diaphoresis, fatigue, fever and unexpected weight change.   HENT: Negative for congestion, dental problem, drooling, ear discharge, ear pain, facial swelling, hearing loss, mouth sores, nosebleeds, postnasal drip, rhinorrhea, sinus pressure, sneezing, sore throat, tinnitus, trouble swallowing and voice change.    Eyes: Negative for photophobia, pain, discharge, redness, itching and visual disturbance.   Respiratory: Negative for apnea, cough, choking, chest tightness, shortness of breath, wheezing and stridor.    Cardiovascular: Negative for chest pain, palpitations and leg swelling.   Gastrointestinal: Negative for abdominal distention, abdominal pain, anal bleeding, blood in stool, constipation, diarrhea, nausea, rectal pain and vomiting.   Endocrine: Negative for cold intolerance, heat intolerance, polydipsia, polyphagia and polyuria.   Genitourinary:  "Positive for penile pain. Negative for decreased urine volume, difficulty urinating, dysuria, enuresis, flank pain, frequency, genital sores, hematuria and urgency.   Musculoskeletal: Positive for back pain. Negative for arthralgias, gait problem, joint swelling, myalgias, neck pain and neck stiffness.   Skin: Negative for color change, pallor, rash and wound.   Allergic/Immunologic: Negative for environmental allergies, food allergies and immunocompromised state.   Neurological: Positive for weakness and numbness. Negative for dizziness, tremors, seizures, syncope, facial asymmetry, speech difficulty, light-headedness and headaches.   Hematological: Negative for adenopathy. Does not bruise/bleed easily.   Psychiatric/Behavioral: Negative for agitation, behavioral problems, confusion, decreased concentration, dysphoric mood, hallucinations, self-injury, sleep disturbance and suicidal ideas. The patient is not nervous/anxious and is not hyperactive.    All other systems reviewed and are negative.      The patient's past medical history, past surgical history, family history, and social history have been reviewed at length in the electronic medical record.    Physical Exam:   Temp 98.2 °F (36.8 °C) (Temporal)   Ht 182.9 cm (72\")   Wt 88 kg (194 lb)   BMI 26.31 kg/m²   MUSCULOSKELETAL:  Straight leg raising is negative.  Julian's Sign is negative.  Tenderness in the back to palpation is not observed.  NEUROLOGICAL:  Strength is intact in the lower extremities to direct testing.  Muscle tone is normal throughout.  Station and gait are normal.  Sensation is intact to light touch testing throughout.      Medical Decision Making    Data Review:   MRI of the lumbar spine dated 2/6/2020 demonstrates  significant narrowing of the L5-S1 disc space where there may have been a dorsal decompression.  There is a grade 1 listhesis of L4 on L5 with high-grade stenosis.  There may be a small synovial cyst emanating from the facet " joints.    Diagnosis:   1.  Lumbar radiculopathy.  2.  L4-5 spondylolisthesis and potential instability.  3.  Lumbar stenosis.    Treatment Options:   I have increased his Neurontin to 4 pills a day.  I have prescribed Norco 10, 1 p.o. twice daily as needed, 50, no refill.  I discussed treatment options with him.  Given his severe symptoms we are going to make arrangements for lumbar decompression with fusion and stabilization at L4-5.  I think that simple decompression is going to result in progressive instability given his MRI findings.  The nature of the procedure as well as the potential risks, complications, limitations, and alternatives to the procedure were discussed at length with the patient and the patient has agreed to proceed with surgery.  I am going to go ahead and reorder his lumbar plain films with flexion-extension views.       Diagnosis Plan   1. Lumbar radiculopathy  HYDROcodone-acetaminophen (NORCO)  MG per tablet   2. Spondylolisthesis of lumbar region         Scribed for Campbell Murillo MD by Crystal Tomas CMA on 04/17/2020 at 11:09 AM      I, Dr. Murillo, personally performed the services described in the documentation, as scribed in my presence, and it is both accurate and complete.

## 2020-04-17 NOTE — H&P
Patient: Nicholas Post  : 1945     Primary Care Provider: Kassandra Manley APRN     Requesting Provider: As above           History     Chief Complaint: Back and right leg pain.     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  He has done well.  Since October he has had low back pain that extends into the right buttock and into the side of the right calf.  He has had some sensory alteration in the right foot.  He is now also complaining of some tingling in the side of his ankle on the right. There is no one position that makes him better or worse.  He did some chiropractic and then saw an orthopedic doctor in [place], [place].  From what I can gather he had a couple of epidural injections performed which were not helpful.  Lifting also makes his symptoms worse.  Neurontin has not been particularly helpful.  He has been taking that regularly.  He was unable to get his x-rays.     Review of Systems   Constitutional: Positive for activity change. Negative for appetite change, chills, diaphoresis, fatigue, fever and unexpected weight change.   HENT: Negative for congestion, dental problem, drooling, ear discharge, ear pain, facial swelling, hearing loss, mouth sores, nosebleeds, postnasal drip, rhinorrhea, sinus pressure, sneezing, sore throat, tinnitus, trouble swallowing and voice change.    Eyes: Negative for photophobia, pain, discharge, redness, itching and visual disturbance.   Respiratory: Negative for apnea, cough, choking, chest tightness, shortness of breath, wheezing and stridor.    Cardiovascular: Negative for chest pain, palpitations and leg swelling.   Gastrointestinal: Negative for abdominal distention, abdominal pain, anal bleeding, blood in stool, constipation, diarrhea, nausea, rectal pain and vomiting.   Endocrine: Negative for cold intolerance, heat intolerance, polydipsia, polyphagia and polyuria.    Genitourinary: Positive for penile pain. Negative for decreased urine volume, difficulty urinating, dysuria, enuresis, flank pain, frequency, genital sores, hematuria and urgency.   Musculoskeletal: Positive for back pain. Negative for arthralgias, gait problem, joint swelling, myalgias, neck pain and neck stiffness.   Skin: Negative for color change, pallor, rash and wound.   Allergic/Immunologic: Negative for environmental allergies, food allergies and immunocompromised state.   Neurological: Positive for weakness and numbness. Negative for dizziness, tremors, seizures, syncope, facial asymmetry, speech difficulty, light-headedness and headaches.   Hematological: Negative for adenopathy. Does not bruise/bleed easily.   Psychiatric/Behavioral: Negative for agitation, behavioral problems, confusion, decreased concentration, dysphoric mood, hallucinations, self-injury, sleep disturbance and suicidal ideas. The patient is not nervous/anxious and is not hyperactive.    All other systems reviewed and are negative.        The patient's past medical history, past surgical history, family history, and social history have been reviewed at length in the electronic medical record.     Past Medical History:   Diagnosis Date   • Osteoarthritis      Past Surgical History:   Procedure Laterality Date   • BACK SURGERY  1990's    Lumbar discectomy-     • KNEE SURGERY Left     Arthroscopy     Family History   Problem Relation Age of Onset   • Cancer Mother    • Stroke Mother    • Heart attack Father      Social History     Socioeconomic History   • Marital status:      Spouse name: Not on file   • Number of children: Not on file   • Years of education: Not on file   • Highest education level: Not on file   Tobacco Use   • Smoking status: Never Smoker   • Smokeless tobacco: Never Used   Substance and Sexual Activity   • Alcohol use: No   • Drug use: Yes     Types: Marijuana     Comment: occasional   • Sexual activity:  "Defer       No Known Allergies    Current Outpatient Medications on File Prior to Visit   Medication Sig Dispense Refill   • acyclovir (ZOVIRAX) 200 MG capsule Take 200 mg by mouth Every 4 (Four) Hours While Awake.     • gabapentin (NEURONTIN) 300 MG capsule Take 1 capsule by mouth 3 (Three) Times a Day. 1 nightly for 3 days, 1 twice a day for 3 days, 1 three times a day thereafter 90 capsule 1   • HYDROcodone-acetaminophen (NORCO)  MG per tablet Take 1 tablet by mouth 2 (Two) Times a Day As Needed for Moderate Pain . 50 tablet 0   • lisinopril-hydrochlorothiazide (PRINZIDE,ZESTORETIC) 10-12.5 MG per tablet TK 1 T PO QD  0   • traZODone (DESYREL) 100 MG tablet TK 1 TO 2 TS PO HS  1     No current facility-administered medications on file prior to visit.         Physical Exam:   Temp 98.2 °F (36.8 °C) (Temporal)   Ht 182.9 cm (72\")   Wt 88 kg (194 lb)   BMI 26.31 kg/m²   MUSCULOSKELETAL:  Straight leg raising is negative.  Julian's Sign is negative.  Tenderness in the back to palpation is not observed.  NEUROLOGICAL:  Strength is intact in the lower extremities to direct testing.  Muscle tone is normal throughout.  Station and gait are normal.  Sensation is intact to light touch testing throughout.        Medical Decision Making     Data Review:   MRI of the lumbar spine dated 2/6/2020 demonstrates  significant narrowing of the L5-S1 disc space where there may have been a dorsal decompression.  There is a grade 1 listhesis of L4 on L5 with high-grade stenosis.  There may be a small synovial cyst emanating from the facet joints.     Diagnosis:   1.  Lumbar radiculopathy.  2.  L4-5 spondylolisthesis and potential instability.  3.  Lumbar stenosis.     Treatment Options:   I have increased his Neurontin to 4 pills a day.  I have prescribed Norco 10, 1 p.o. twice daily as needed, 50, no refill.  I discussed treatment options with him.  Given his severe symptoms we are going to make arrangements for lumbar " decompression with fusion and stabilization at L4-5.  I think that simple decompression is going to result in progressive instability given his MRI findings.  The nature of the procedure as well as the potential risks, complications, limitations, and alternatives to the procedure were discussed at length with the patient and the patient has agreed to proceed with surgery.  I am going to go ahead and reorder his lumbar plain films with flexion-extension views.          Diagnosis Plan   1. Lumbar radiculopathy  HYDROcodone-acetaminophen (NORCO)  MG per tablet   2. Spondylolisthesis of lumbar region

## 2020-06-01 DIAGNOSIS — M54.16 LUMBAR RADICULOPATHY: ICD-10-CM

## 2020-06-01 RX ORDER — GABAPENTIN 300 MG/1
300 CAPSULE ORAL 3 TIMES DAILY
Qty: 90 CAPSULE | Refills: 1 | OUTPATIENT
Start: 2020-06-01 | End: 2020-09-02

## 2020-06-01 RX ORDER — HYDROCODONE BITARTRATE AND ACETAMINOPHEN 10; 325 MG/1; MG/1
1 TABLET ORAL 2 TIMES DAILY PRN
Qty: 50 TABLET | Refills: 0 | Status: ON HOLD | OUTPATIENT
Start: 2020-06-01 | End: 2020-09-04 | Stop reason: SDUPTHER

## 2020-06-01 NOTE — TELEPHONE ENCOUNTER
Spoke with pt and discussed refill. Pt spoke with Radha this morning about setting up his surgery for the first week of July. She will call him back to finalize this. I will call the pt into vince Josue aware. Hydrocodone signed and placed in the mail, per pt request. Address verified.

## 2020-06-01 NOTE — TELEPHONE ENCOUNTER
Ok. Dr. Murillo said we can fill his pain medication until July.   - if he does not have his surgery by then, future refills will need to be from PCP

## 2020-06-01 NOTE — TELEPHONE ENCOUNTER
Provider:  Chidi  Caller: self  Time of call: 921    Phone #: [phone]   Surgery:  upcoming  Last visit:   sx  Next visit: 4/17/20    [place]:         03/11/2020 Hydrocodone/Acetaminophen  325MG/10MG  1945 12 3 Kassandra Manley [facility] 40 1  03/20/2020 Gabapentin 300MG 1945 90 30 Campbell Murillo [facility] 1  04/17/2020 Hydrocodone/Acetaminophen  325MG/10MG  1945 50 25 Campbell Murillo [facility] 20 1    Reason for call:       Refill requests

## 2020-06-01 NOTE — TELEPHONE ENCOUNTER
Blas Garcia,     Do you know when he is scheduled for surgery?   - if he has been scheduled then we can refill the pain medication   - however, if he has yet to be scheduled then he will need to get pain medication from his PCP

## 2020-07-06 ENCOUNTER — APPOINTMENT (OUTPATIENT)
Dept: PREADMISSION TESTING | Facility: HOSPITAL | Age: 75
End: 2020-07-06

## 2020-08-17 ENCOUNTER — TELEPHONE (OUTPATIENT)
Dept: NEUROSURGERY | Facility: CLINIC | Age: 75
End: 2020-08-17

## 2020-08-17 NOTE — TELEPHONE ENCOUNTER
Patient left voicemail requesting call back.  Attempted to call patient with no answer.  Could not leave voicemail.

## 2020-09-01 ENCOUNTER — APPOINTMENT (OUTPATIENT)
Dept: PREADMISSION TESTING | Facility: HOSPITAL | Age: 75
End: 2020-09-01

## 2020-09-01 VITALS — BODY MASS INDEX: 26.34 KG/M2 | HEIGHT: 72 IN | WEIGHT: 194.45 LBS

## 2020-09-01 DIAGNOSIS — M54.16 LUMBAR RADICULOPATHY: ICD-10-CM

## 2020-09-01 LAB
DEPRECATED RDW RBC AUTO: 45.5 FL (ref 37–54)
ERYTHROCYTE [DISTWIDTH] IN BLOOD BY AUTOMATED COUNT: 13.4 % (ref 12.3–15.4)
HBA1C MFR BLD: 5.2 % (ref 4.8–5.6)
HCT VFR BLD AUTO: 51.4 % (ref 37.5–51)
HGB BLD-MCNC: 16.6 G/DL (ref 13–17.7)
MCH RBC QN AUTO: 29.6 PG (ref 26.6–33)
MCHC RBC AUTO-ENTMCNC: 32.3 G/DL (ref 31.5–35.7)
MCV RBC AUTO: 91.8 FL (ref 79–97)
MRSA DNA SPEC QL NAA+PROBE: NEGATIVE
PLATELET # BLD AUTO: 249 10*3/MM3 (ref 140–450)
PMV BLD AUTO: 10.8 FL (ref 6–12)
POTASSIUM SERPL-SCNC: 5.3 MMOL/L (ref 3.5–5.2)
RBC # BLD AUTO: 5.6 10*6/MM3 (ref 4.14–5.8)
WBC # BLD AUTO: 7.04 10*3/MM3 (ref 3.4–10.8)

## 2020-09-01 PROCEDURE — 83036 HEMOGLOBIN GLYCOSYLATED A1C: CPT | Performed by: NEUROLOGICAL SURGERY

## 2020-09-01 PROCEDURE — 87641 MR-STAPH DNA AMP PROBE: CPT | Performed by: NEUROLOGICAL SURGERY

## 2020-09-01 PROCEDURE — 93010 ELECTROCARDIOGRAM REPORT: CPT | Performed by: INTERNAL MEDICINE

## 2020-09-01 PROCEDURE — 84132 ASSAY OF SERUM POTASSIUM: CPT | Performed by: NEUROLOGICAL SURGERY

## 2020-09-01 PROCEDURE — 93005 ELECTROCARDIOGRAM TRACING: CPT

## 2020-09-01 PROCEDURE — U0002 COVID-19 LAB TEST NON-CDC: HCPCS

## 2020-09-01 PROCEDURE — 36415 COLL VENOUS BLD VENIPUNCTURE: CPT

## 2020-09-01 PROCEDURE — C9803 HOPD COVID-19 SPEC COLLECT: HCPCS

## 2020-09-01 PROCEDURE — 85027 COMPLETE CBC AUTOMATED: CPT | Performed by: NEUROLOGICAL SURGERY

## 2020-09-01 RX ORDER — OMEPRAZOLE 40 MG/1
40 CAPSULE, DELAYED RELEASE ORAL DAILY
COMMUNITY

## 2020-09-01 RX ORDER — LISINOPRIL 10 MG/1
10 TABLET ORAL NIGHTLY
COMMUNITY

## 2020-09-01 RX ORDER — DEXTROAMPHETAMINE SACCHARATE, AMPHETAMINE ASPARTATE, DEXTROAMPHETAMINE SULFATE AND AMPHETAMINE SULFATE 5; 5; 5; 5 MG/1; MG/1; MG/1; MG/1
20 TABLET ORAL 3 TIMES DAILY PRN
COMMUNITY

## 2020-09-01 NOTE — PAT
Bactroban and Chlorhexidine Prescription given during PAT visit, as well as written and verbal instructions given to patient during PAT visit.  Patient/family also instructed to complete skin prep checklist and return the checklist on the day of surgery to preoperative staff.  Patient/family verbalized understanding.    Patient to apply Chlorhexadine wipes  to surgical area (as instructed) the night before procedure and the AM of procedure. Wipes provided.    Per Anesthesia Request, patient instructed not to take their ACE/ARB medications on the AM of surgery.    Patient instructed to drink 20 ounces (or until full) of Gatorade and it needs to be completed 1 hour before given arrival time for procedure (NO RED Gatorade)    Patient verbalized understanding.

## 2020-09-02 LAB
REF LAB TEST METHOD: NORMAL
SARS-COV-2 RNA RESP QL NAA+PROBE: NOT DETECTED

## 2020-09-02 RX ORDER — GABAPENTIN 300 MG/1
CAPSULE ORAL
Qty: 90 CAPSULE | Refills: 1 | Status: SHIPPED | OUTPATIENT
Start: 2020-09-02 | End: 2021-02-10

## 2020-09-02 NOTE — TELEPHONE ENCOUNTER
Provider:  Chidi  Caller:  Automated refill request  Surgery:  Fusion  Surgery Date:  Upcoming - tomorrow  Last visit:  04/17/20  Next visit: sx    Reason for call:         Requested Prescriptions     Pending Prescriptions Disp Refills   • gabapentin (NEURONTIN) 300 MG capsule [Pharmacy Med Name: GABAPENTIN 300MG CAPSULES] 90 capsule      Sig: TAKE ONE CAPSULE BY MOUTH AT BEDTIME FOR 3 DAYS, THEN 1 TWICE DAILY FOR 3 DAYS, THEN 1 CAPSULE THREE TIMES DAILY THEREAFTER     Miguel:     06/01/2020 Gabapentin 300MG 1945 90 30 CHIDI WAGGONER [facility] 1  06/08/2020 Hydrocodone/Acetaminophen  325MG/10MG  1945 50 25 ELIEZER DC [facility] 20 1  07/28/2020 Gabapentin 300MG 1945 90 30 CHIDI WAGGONER [facility] 1

## 2020-09-03 ENCOUNTER — APPOINTMENT (OUTPATIENT)
Dept: GENERAL RADIOLOGY | Facility: HOSPITAL | Age: 75
End: 2020-09-03

## 2020-09-03 ENCOUNTER — ANESTHESIA EVENT (OUTPATIENT)
Dept: PERIOP | Facility: HOSPITAL | Age: 75
End: 2020-09-03

## 2020-09-03 ENCOUNTER — ANESTHESIA (OUTPATIENT)
Dept: PERIOP | Facility: HOSPITAL | Age: 75
End: 2020-09-03

## 2020-09-03 ENCOUNTER — HOSPITAL ENCOUNTER (INPATIENT)
Facility: HOSPITAL | Age: 75
LOS: 2 days | Discharge: HOME | End: 2020-09-05
Attending: NEUROLOGICAL SURGERY | Admitting: NEUROLOGICAL SURGERY

## 2020-09-03 DIAGNOSIS — Z78.9 IMPAIRED MOBILITY AND ADLS: Primary | ICD-10-CM

## 2020-09-03 DIAGNOSIS — Z74.09 IMPAIRED MOBILITY AND ADLS: Primary | ICD-10-CM

## 2020-09-03 DIAGNOSIS — M54.16 LUMBAR RADICULOPATHY: ICD-10-CM

## 2020-09-03 DIAGNOSIS — M43.10 SPONDYLOLISTHESIS, ACQUIRED: ICD-10-CM

## 2020-09-03 PROCEDURE — 22630 ARTHRD PST TQ 1NTRSPC LUM: CPT | Performed by: PHYSICIAN ASSISTANT

## 2020-09-03 PROCEDURE — 22853 INSJ BIOMECHANICAL DEVICE: CPT | Performed by: PHYSICIAN ASSISTANT

## 2020-09-03 PROCEDURE — C1713 ANCHOR/SCREW BN/BN,TIS/BN: HCPCS | Performed by: NEUROLOGICAL SURGERY

## 2020-09-03 PROCEDURE — 0SB20ZZ EXCISION OF LUMBAR VERTEBRAL DISC, OPEN APPROACH: ICD-10-PCS | Performed by: NEUROLOGICAL SURGERY

## 2020-09-03 PROCEDURE — 25010000002 ONDANSETRON PER 1 MG: Performed by: NURSE ANESTHETIST, CERTIFIED REGISTERED

## 2020-09-03 PROCEDURE — 76000 FLUOROSCOPY <1 HR PHYS/QHP: CPT

## 2020-09-03 PROCEDURE — 22840 INSERT SPINE FIXATION DEVICE: CPT | Performed by: NEUROLOGICAL SURGERY

## 2020-09-03 PROCEDURE — 25010000003 CEFAZOLIN IN DEXTROSE 2-4 GM/100ML-% SOLUTION: Performed by: NEUROLOGICAL SURGERY

## 2020-09-03 PROCEDURE — 22853 INSJ BIOMECHANICAL DEVICE: CPT | Performed by: NEUROLOGICAL SURGERY

## 2020-09-03 PROCEDURE — 25010000002 PROPOFOL 10 MG/ML EMULSION: Performed by: NURSE ANESTHETIST, CERTIFIED REGISTERED

## 2020-09-03 PROCEDURE — 61783 SCAN PROC SPINAL: CPT | Performed by: NEUROLOGICAL SURGERY

## 2020-09-03 PROCEDURE — S0260 H&P FOR SURGERY: HCPCS | Performed by: NURSE PRACTITIONER

## 2020-09-03 PROCEDURE — 25010000002 FENTANYL CITRATE (PF) 100 MCG/2ML SOLUTION: Performed by: NURSE ANESTHETIST, CERTIFIED REGISTERED

## 2020-09-03 PROCEDURE — 25010000002 DEXAMETHASONE PER 1 MG: Performed by: NURSE ANESTHETIST, CERTIFIED REGISTERED

## 2020-09-03 PROCEDURE — 22840 INSERT SPINE FIXATION DEVICE: CPT | Performed by: PHYSICIAN ASSISTANT

## 2020-09-03 PROCEDURE — 25010000002 VANCOMYCIN 1 G RECONSTITUTED SOLUTION: Performed by: NEUROLOGICAL SURGERY

## 2020-09-03 PROCEDURE — 0SG007J FUSION OF LUMBAR VERTEBRAL JOINT WITH AUTOLOGOUS TISSUE SUBSTITUTE, POSTERIOR APPROACH, ANTERIOR COLUMN, OPEN APPROACH: ICD-10-PCS | Performed by: NEUROLOGICAL SURGERY

## 2020-09-03 PROCEDURE — 0SG00AJ FUSION OF LUMBAR VERTEBRAL JOINT WITH INTERBODY FUSION DEVICE, POSTERIOR APPROACH, ANTERIOR COLUMN, OPEN APPROACH: ICD-10-PCS | Performed by: NEUROLOGICAL SURGERY

## 2020-09-03 PROCEDURE — 22630 ARTHRD PST TQ 1NTRSPC LUM: CPT | Performed by: NEUROLOGICAL SURGERY

## 2020-09-03 PROCEDURE — 25010000002 NEOSTIGMINE 10 MG/10ML SOLUTION: Performed by: NURSE ANESTHETIST, CERTIFIED REGISTERED

## 2020-09-03 DEVICE — DBM T44145 5CC ORTHOBLEND SMALL DEFGRAFT
Type: IMPLANTABLE DEVICE | Site: SPINE LUMBAR | Status: FUNCTIONAL
Brand: GRAFTON®AND GRAFTON PLUS®DEMINERALIZED BONE MATRIX (DBM)

## 2020-09-03 DEVICE — HEMOST ABS SURGIFOAM SZ100 8X12 10MM: Type: IMPLANTABLE DEVICE | Site: SPINE LUMBAR | Status: FUNCTIONAL

## 2020-09-03 DEVICE — CDH LEGACY PEEK 7226550 MAS 6.5X50MM
Type: IMPLANTABLE DEVICE | Site: SPINE LUMBAR | Status: FUNCTIONAL
Brand: CD HORIZON® SPINAL SYSTEM

## 2020-09-03 DEVICE — SPACER 2991022 CAPSTONE PEEK 10X22
Type: IMPLANTABLE DEVICE | Site: SPINE LUMBAR | Status: FUNCTIONAL
Brand: CAPSTONE® SPINAL SYSTEM

## 2020-09-03 DEVICE — FLOSEAL HEMOSTATIC MATRIX, 10ML
Type: IMPLANTABLE DEVICE | Site: SPINE LUMBAR | Status: FUNCTIONAL
Brand: FLOSEAL HEMOSTATIC MATRIX

## 2020-09-03 RX ORDER — LISINOPRIL 10 MG/1
10 TABLET ORAL NIGHTLY
Status: DISCONTINUED | OUTPATIENT
Start: 2020-09-03 | End: 2020-09-05 | Stop reason: HOSPADM

## 2020-09-03 RX ORDER — IPRATROPIUM BROMIDE AND ALBUTEROL SULFATE 2.5; .5 MG/3ML; MG/3ML
3 SOLUTION RESPIRATORY (INHALATION) ONCE AS NEEDED
Status: DISCONTINUED | OUTPATIENT
Start: 2020-09-03 | End: 2020-09-03 | Stop reason: HOSPADM

## 2020-09-03 RX ORDER — ACETAMINOPHEN 325 MG/1
650 TABLET ORAL 3 TIMES DAILY
Status: DISCONTINUED | OUTPATIENT
Start: 2020-09-03 | End: 2020-09-03

## 2020-09-03 RX ORDER — IBUPROFEN 600 MG/1
600 TABLET ORAL 3 TIMES DAILY
Status: DISCONTINUED | OUTPATIENT
Start: 2020-09-03 | End: 2020-09-05 | Stop reason: HOSPADM

## 2020-09-03 RX ORDER — HYDROMORPHONE HYDROCHLORIDE 1 MG/ML
0.5 INJECTION, SOLUTION INTRAMUSCULAR; INTRAVENOUS; SUBCUTANEOUS
Status: DISCONTINUED | OUTPATIENT
Start: 2020-09-03 | End: 2020-09-03 | Stop reason: HOSPADM

## 2020-09-03 RX ORDER — SODIUM CHLORIDE, SODIUM LACTATE, POTASSIUM CHLORIDE, CALCIUM CHLORIDE 600; 310; 30; 20 MG/100ML; MG/100ML; MG/100ML; MG/100ML
90 INJECTION, SOLUTION INTRAVENOUS CONTINUOUS
Status: DISCONTINUED | OUTPATIENT
Start: 2020-09-03 | End: 2020-09-04

## 2020-09-03 RX ORDER — LIDOCAINE HYDROCHLORIDE 10 MG/ML
0.5 INJECTION, SOLUTION EPIDURAL; INFILTRATION; INTRACAUDAL; PERINEURAL ONCE AS NEEDED
Status: COMPLETED | OUTPATIENT
Start: 2020-09-03 | End: 2020-09-03

## 2020-09-03 RX ORDER — PROMETHAZINE HYDROCHLORIDE 12.5 MG/1
12.5 TABLET ORAL EVERY 6 HOURS PRN
Status: DISCONTINUED | OUTPATIENT
Start: 2020-09-03 | End: 2020-09-05 | Stop reason: HOSPADM

## 2020-09-03 RX ORDER — DIPHENHYDRAMINE HCL 25 MG
25 CAPSULE ORAL NIGHTLY PRN
Status: DISCONTINUED | OUTPATIENT
Start: 2020-09-03 | End: 2020-09-05 | Stop reason: HOSPADM

## 2020-09-03 RX ORDER — CEFAZOLIN SODIUM 2 G/100ML
2 INJECTION, SOLUTION INTRAVENOUS EVERY 8 HOURS
Status: COMPLETED | OUTPATIENT
Start: 2020-09-03 | End: 2020-09-04

## 2020-09-03 RX ORDER — NALOXONE HCL 0.4 MG/ML
0.4 VIAL (ML) INJECTION
Status: DISCONTINUED | OUTPATIENT
Start: 2020-09-03 | End: 2020-09-05 | Stop reason: HOSPADM

## 2020-09-03 RX ORDER — LIDOCAINE HYDROCHLORIDE 10 MG/ML
INJECTION, SOLUTION EPIDURAL; INFILTRATION; INTRACAUDAL; PERINEURAL AS NEEDED
Status: DISCONTINUED | OUTPATIENT
Start: 2020-09-03 | End: 2020-09-03 | Stop reason: SURG

## 2020-09-03 RX ORDER — FAMOTIDINE 20 MG/1
20 TABLET, FILM COATED ORAL ONCE
Status: COMPLETED | OUTPATIENT
Start: 2020-09-03 | End: 2020-09-03

## 2020-09-03 RX ORDER — BUPIVACAINE HYDROCHLORIDE AND EPINEPHRINE 2.5; 5 MG/ML; UG/ML
INJECTION, SOLUTION EPIDURAL; INFILTRATION; INTRACAUDAL; PERINEURAL AS NEEDED
Status: DISCONTINUED | OUTPATIENT
Start: 2020-09-03 | End: 2020-09-03 | Stop reason: HOSPADM

## 2020-09-03 RX ORDER — FENTANYL CITRATE 50 UG/ML
50 INJECTION, SOLUTION INTRAMUSCULAR; INTRAVENOUS
Status: DISCONTINUED | OUTPATIENT
Start: 2020-09-03 | End: 2020-09-03 | Stop reason: HOSPADM

## 2020-09-03 RX ORDER — MORPHINE SULFATE 10 MG/ML
5 INJECTION INTRAMUSCULAR; INTRAVENOUS; SUBCUTANEOUS EVERY 4 HOURS PRN
Status: DISCONTINUED | OUTPATIENT
Start: 2020-09-03 | End: 2020-09-05 | Stop reason: HOSPADM

## 2020-09-03 RX ORDER — SODIUM CHLORIDE, SODIUM LACTATE, POTASSIUM CHLORIDE, CALCIUM CHLORIDE 600; 310; 30; 20 MG/100ML; MG/100ML; MG/100ML; MG/100ML
9 INJECTION, SOLUTION INTRAVENOUS CONTINUOUS
Status: DISCONTINUED | OUTPATIENT
Start: 2020-09-03 | End: 2020-09-05 | Stop reason: HOSPADM

## 2020-09-03 RX ORDER — OXYCODONE HCL 10 MG/1
10 TABLET, FILM COATED, EXTENDED RELEASE ORAL ONCE
Status: COMPLETED | OUTPATIENT
Start: 2020-09-03 | End: 2020-09-03

## 2020-09-03 RX ORDER — NEOSTIGMINE METHYLSULFATE 1 MG/ML
INJECTION, SOLUTION INTRAVENOUS AS NEEDED
Status: DISCONTINUED | OUTPATIENT
Start: 2020-09-03 | End: 2020-09-03 | Stop reason: SURG

## 2020-09-03 RX ORDER — MAGNESIUM HYDROXIDE 1200 MG/15ML
LIQUID ORAL AS NEEDED
Status: DISCONTINUED | OUTPATIENT
Start: 2020-09-03 | End: 2020-09-03 | Stop reason: HOSPADM

## 2020-09-03 RX ORDER — PROMETHAZINE HYDROCHLORIDE 12.5 MG/1
12.5 SUPPOSITORY RECTAL EVERY 6 HOURS PRN
Status: DISCONTINUED | OUTPATIENT
Start: 2020-09-03 | End: 2020-09-05 | Stop reason: HOSPADM

## 2020-09-03 RX ORDER — DEXAMETHASONE SODIUM PHOSPHATE 4 MG/ML
INJECTION, SOLUTION INTRA-ARTICULAR; INTRALESIONAL; INTRAMUSCULAR; INTRAVENOUS; SOFT TISSUE AS NEEDED
Status: DISCONTINUED | OUTPATIENT
Start: 2020-09-03 | End: 2020-09-03 | Stop reason: SURG

## 2020-09-03 RX ORDER — ACETAMINOPHEN 500 MG
1000 TABLET ORAL ONCE
Status: COMPLETED | OUTPATIENT
Start: 2020-09-03 | End: 2020-09-03

## 2020-09-03 RX ORDER — FENTANYL CITRATE 50 UG/ML
INJECTION, SOLUTION INTRAMUSCULAR; INTRAVENOUS AS NEEDED
Status: DISCONTINUED | OUTPATIENT
Start: 2020-09-03 | End: 2020-09-03 | Stop reason: SURG

## 2020-09-03 RX ORDER — SODIUM CHLORIDE 0.9 % (FLUSH) 0.9 %
3 SYRINGE (ML) INJECTION EVERY 12 HOURS SCHEDULED
Status: DISCONTINUED | OUTPATIENT
Start: 2020-09-03 | End: 2020-09-05 | Stop reason: HOSPADM

## 2020-09-03 RX ORDER — OXYCODONE AND ACETAMINOPHEN 7.5; 325 MG/1; MG/1
1 TABLET ORAL EVERY 4 HOURS PRN
Status: DISCONTINUED | OUTPATIENT
Start: 2020-09-03 | End: 2020-09-03

## 2020-09-03 RX ORDER — SODIUM CHLORIDE, SODIUM LACTATE, POTASSIUM CHLORIDE, CALCIUM CHLORIDE 600; 310; 30; 20 MG/100ML; MG/100ML; MG/100ML; MG/100ML
INJECTION, SOLUTION INTRAVENOUS CONTINUOUS PRN
Status: DISCONTINUED | OUTPATIENT
Start: 2020-09-03 | End: 2020-09-03 | Stop reason: SURG

## 2020-09-03 RX ORDER — ONDANSETRON 2 MG/ML
4 INJECTION INTRAMUSCULAR; INTRAVENOUS ONCE AS NEEDED
Status: DISCONTINUED | OUTPATIENT
Start: 2020-09-03 | End: 2020-09-03 | Stop reason: HOSPADM

## 2020-09-03 RX ORDER — PROPOFOL 10 MG/ML
VIAL (ML) INTRAVENOUS AS NEEDED
Status: DISCONTINUED | OUTPATIENT
Start: 2020-09-03 | End: 2020-09-03 | Stop reason: SURG

## 2020-09-03 RX ORDER — VANCOMYCIN HYDROCHLORIDE 1 G/20ML
INJECTION, POWDER, LYOPHILIZED, FOR SOLUTION INTRAVENOUS AS NEEDED
Status: DISCONTINUED | OUTPATIENT
Start: 2020-09-03 | End: 2020-09-03 | Stop reason: HOSPADM

## 2020-09-03 RX ORDER — SODIUM CHLORIDE 0.9 % (FLUSH) 0.9 %
10 SYRINGE (ML) INJECTION AS NEEDED
Status: CANCELLED | OUTPATIENT
Start: 2020-09-03

## 2020-09-03 RX ORDER — GABAPENTIN 300 MG/1
300 CAPSULE ORAL EVERY 8 HOURS SCHEDULED
Status: DISCONTINUED | OUTPATIENT
Start: 2020-09-03 | End: 2020-09-05 | Stop reason: HOSPADM

## 2020-09-03 RX ORDER — FAMOTIDINE 10 MG/ML
20 INJECTION, SOLUTION INTRAVENOUS ONCE
Status: CANCELLED | OUTPATIENT
Start: 2020-09-03 | End: 2020-09-03

## 2020-09-03 RX ORDER — SODIUM CHLORIDE 0.9 % (FLUSH) 0.9 %
3-10 SYRINGE (ML) INJECTION AS NEEDED
Status: DISCONTINUED | OUTPATIENT
Start: 2020-09-03 | End: 2020-09-03 | Stop reason: HOSPADM

## 2020-09-03 RX ORDER — HYDRALAZINE HYDROCHLORIDE 20 MG/ML
5 INJECTION INTRAMUSCULAR; INTRAVENOUS
Status: DISCONTINUED | OUTPATIENT
Start: 2020-09-03 | End: 2020-09-03 | Stop reason: HOSPADM

## 2020-09-03 RX ORDER — NALOXONE HCL 0.4 MG/ML
0.4 VIAL (ML) INJECTION AS NEEDED
Status: DISCONTINUED | OUTPATIENT
Start: 2020-09-03 | End: 2020-09-03 | Stop reason: HOSPADM

## 2020-09-03 RX ORDER — HYDROCODONE BITARTRATE AND ACETAMINOPHEN 5; 325 MG/1; MG/1
1 TABLET ORAL ONCE AS NEEDED
Status: DISCONTINUED | OUTPATIENT
Start: 2020-09-03 | End: 2020-09-03 | Stop reason: HOSPADM

## 2020-09-03 RX ORDER — GLYCOPYRROLATE 0.2 MG/ML
INJECTION INTRAMUSCULAR; INTRAVENOUS AS NEEDED
Status: DISCONTINUED | OUTPATIENT
Start: 2020-09-03 | End: 2020-09-03 | Stop reason: SURG

## 2020-09-03 RX ORDER — TRAZODONE HYDROCHLORIDE 100 MG/1
300 TABLET ORAL NIGHTLY
Status: DISCONTINUED | OUTPATIENT
Start: 2020-09-03 | End: 2020-09-05 | Stop reason: HOSPADM

## 2020-09-03 RX ORDER — ONDANSETRON 2 MG/ML
4 INJECTION INTRAMUSCULAR; INTRAVENOUS EVERY 6 HOURS PRN
Status: DISCONTINUED | OUTPATIENT
Start: 2020-09-03 | End: 2020-09-05 | Stop reason: HOSPADM

## 2020-09-03 RX ORDER — HYDROCODONE BITARTRATE AND ACETAMINOPHEN 7.5; 325 MG/1; MG/1
1 TABLET ORAL EVERY 4 HOURS PRN
Status: DISCONTINUED | OUTPATIENT
Start: 2020-09-03 | End: 2020-09-05 | Stop reason: HOSPADM

## 2020-09-03 RX ORDER — SODIUM CHLORIDE 0.9 % (FLUSH) 0.9 %
10 SYRINGE (ML) INJECTION EVERY 12 HOURS SCHEDULED
Status: CANCELLED | OUTPATIENT
Start: 2020-09-03

## 2020-09-03 RX ORDER — SODIUM CHLORIDE 9 MG/ML
INJECTION, SOLUTION INTRAVENOUS AS NEEDED
Status: DISCONTINUED | OUTPATIENT
Start: 2020-09-03 | End: 2020-09-03 | Stop reason: HOSPADM

## 2020-09-03 RX ORDER — ONDANSETRON 4 MG/1
4 TABLET, FILM COATED ORAL EVERY 6 HOURS PRN
Status: DISCONTINUED | OUTPATIENT
Start: 2020-09-03 | End: 2020-09-05 | Stop reason: HOSPADM

## 2020-09-03 RX ORDER — CEFAZOLIN SODIUM 2 G/100ML
2 INJECTION, SOLUTION INTRAVENOUS ONCE
Status: COMPLETED | OUTPATIENT
Start: 2020-09-03 | End: 2020-09-03

## 2020-09-03 RX ORDER — ONDANSETRON 2 MG/ML
INJECTION INTRAMUSCULAR; INTRAVENOUS AS NEEDED
Status: DISCONTINUED | OUTPATIENT
Start: 2020-09-03 | End: 2020-09-03 | Stop reason: SURG

## 2020-09-03 RX ORDER — LABETALOL HYDROCHLORIDE 5 MG/ML
5 INJECTION, SOLUTION INTRAVENOUS
Status: DISCONTINUED | OUTPATIENT
Start: 2020-09-03 | End: 2020-09-03 | Stop reason: HOSPADM

## 2020-09-03 RX ORDER — SODIUM CHLORIDE 0.9 % (FLUSH) 0.9 %
3 SYRINGE (ML) INJECTION EVERY 12 HOURS SCHEDULED
Status: DISCONTINUED | OUTPATIENT
Start: 2020-09-03 | End: 2020-09-03 | Stop reason: HOSPADM

## 2020-09-03 RX ORDER — MEPERIDINE HYDROCHLORIDE 25 MG/ML
12.5 INJECTION INTRAMUSCULAR; INTRAVENOUS; SUBCUTANEOUS
Status: DISCONTINUED | OUTPATIENT
Start: 2020-09-03 | End: 2020-09-03 | Stop reason: HOSPADM

## 2020-09-03 RX ORDER — MORPHINE SULFATE 1 MG/ML
2 INJECTION, SOLUTION EPIDURAL; INTRATHECAL; INTRAVENOUS EVERY 4 HOURS PRN
Status: DISCONTINUED | OUTPATIENT
Start: 2020-09-03 | End: 2020-09-05 | Stop reason: HOSPADM

## 2020-09-03 RX ORDER — SODIUM CHLORIDE 0.9 % (FLUSH) 0.9 %
10 SYRINGE (ML) INJECTION AS NEEDED
Status: DISCONTINUED | OUTPATIENT
Start: 2020-09-03 | End: 2020-09-05 | Stop reason: HOSPADM

## 2020-09-03 RX ORDER — ROCURONIUM BROMIDE 10 MG/ML
INJECTION, SOLUTION INTRAVENOUS AS NEEDED
Status: DISCONTINUED | OUTPATIENT
Start: 2020-09-03 | End: 2020-09-03 | Stop reason: SURG

## 2020-09-03 RX ORDER — ACETAMINOPHEN 325 MG/1
650 TABLET ORAL 3 TIMES DAILY
Status: DISCONTINUED | OUTPATIENT
Start: 2020-09-03 | End: 2020-09-05 | Stop reason: HOSPADM

## 2020-09-03 RX ORDER — PANTOPRAZOLE SODIUM 40 MG/1
40 TABLET, DELAYED RELEASE ORAL EVERY MORNING
Status: DISCONTINUED | OUTPATIENT
Start: 2020-09-04 | End: 2020-09-05 | Stop reason: HOSPADM

## 2020-09-03 RX ORDER — IBUPROFEN 800 MG/1
800 TABLET ORAL ONCE
Status: COMPLETED | OUTPATIENT
Start: 2020-09-03 | End: 2020-09-03

## 2020-09-03 RX ADMIN — TRAZODONE HYDROCHLORIDE 300 MG: 100 TABLET ORAL at 21:30

## 2020-09-03 RX ADMIN — ROCURONIUM BROMIDE 70 MG: 10 INJECTION INTRAVENOUS at 13:21

## 2020-09-03 RX ADMIN — GABAPENTIN 300 MG: 300 CAPSULE ORAL at 21:29

## 2020-09-03 RX ADMIN — PROPOFOL 200 MG: 10 INJECTION, EMULSION INTRAVENOUS at 13:21

## 2020-09-03 RX ADMIN — SODIUM CHLORIDE, POTASSIUM CHLORIDE, SODIUM LACTATE AND CALCIUM CHLORIDE: 600; 310; 30; 20 INJECTION, SOLUTION INTRAVENOUS at 14:59

## 2020-09-03 RX ADMIN — CEFAZOLIN SODIUM 2 G: 2 INJECTION, SOLUTION INTRAVENOUS at 21:29

## 2020-09-03 RX ADMIN — DEXAMETHASONE SODIUM PHOSPHATE 8 MG: 4 INJECTION, SOLUTION INTRAMUSCULAR; INTRAVENOUS at 13:25

## 2020-09-03 RX ADMIN — SODIUM CHLORIDE, POTASSIUM CHLORIDE, SODIUM LACTATE AND CALCIUM CHLORIDE 9 ML/HR: 600; 310; 30; 20 INJECTION, SOLUTION INTRAVENOUS at 12:05

## 2020-09-03 RX ADMIN — LIDOCAINE HYDROCHLORIDE 0.2 ML: 10 INJECTION, SOLUTION EPIDURAL; INFILTRATION; INTRACAUDAL; PERINEURAL at 12:05

## 2020-09-03 RX ADMIN — ACETAMINOPHEN 650 MG: 325 TABLET, FILM COATED ORAL at 19:55

## 2020-09-03 RX ADMIN — CEFAZOLIN SODIUM 2 G: 2 INJECTION, SOLUTION INTRAVENOUS at 13:25

## 2020-09-03 RX ADMIN — SODIUM CHLORIDE, POTASSIUM CHLORIDE, SODIUM LACTATE AND CALCIUM CHLORIDE 90 ML/HR: 600; 310; 30; 20 INJECTION, SOLUTION INTRAVENOUS at 17:16

## 2020-09-03 RX ADMIN — IBUPROFEN 600 MG: 600 TABLET, FILM COATED ORAL at 19:56

## 2020-09-03 RX ADMIN — EPHEDRINE SULFATE 10 MG: 50 INJECTION INTRAMUSCULAR; INTRAVENOUS; SUBCUTANEOUS at 14:15

## 2020-09-03 RX ADMIN — GLYCOPYRROLATE 0.4 MG: 0.2 INJECTION INTRAMUSCULAR; INTRAVENOUS at 16:08

## 2020-09-03 RX ADMIN — ONDANSETRON 4 MG: 2 INJECTION INTRAMUSCULAR; INTRAVENOUS at 16:01

## 2020-09-03 RX ADMIN — OXYCODONE HYDROCHLORIDE 10 MG: 10 TABLET, FILM COATED, EXTENDED RELEASE ORAL at 12:19

## 2020-09-03 RX ADMIN — ACETAMINOPHEN 1000 MG: 500 TABLET ORAL at 12:18

## 2020-09-03 RX ADMIN — FAMOTIDINE 20 MG: 20 TABLET, FILM COATED ORAL at 12:18

## 2020-09-03 RX ADMIN — SODIUM CHLORIDE, POTASSIUM CHLORIDE, SODIUM LACTATE AND CALCIUM CHLORIDE: 600; 310; 30; 20 INJECTION, SOLUTION INTRAVENOUS at 13:18

## 2020-09-03 RX ADMIN — LIDOCAINE HYDROCHLORIDE 50 MG: 10 INJECTION, SOLUTION EPIDURAL; INFILTRATION; INTRACAUDAL; PERINEURAL at 13:21

## 2020-09-03 RX ADMIN — EPHEDRINE SULFATE 10 MG: 50 INJECTION INTRAMUSCULAR; INTRAVENOUS; SUBCUTANEOUS at 14:03

## 2020-09-03 RX ADMIN — IBUPROFEN 800 MG: 800 TABLET, FILM COATED ORAL at 12:19

## 2020-09-03 RX ADMIN — LISINOPRIL 10 MG: 10 TABLET ORAL at 19:56

## 2020-09-03 RX ADMIN — EPHEDRINE SULFATE 10 MG: 50 INJECTION INTRAMUSCULAR; INTRAVENOUS; SUBCUTANEOUS at 14:18

## 2020-09-03 RX ADMIN — NEOSTIGMINE 3 MG: 1 INJECTION INTRAVENOUS at 16:08

## 2020-09-03 RX ADMIN — HYDROCODONE BITARTRATE AND ACETAMINOPHEN 1 TABLET: 7.5; 325 TABLET ORAL at 21:29

## 2020-09-03 RX ADMIN — FENTANYL CITRATE 50 MCG: 50 INJECTION, SOLUTION INTRAMUSCULAR; INTRAVENOUS at 15:58

## 2020-09-03 RX ADMIN — FENTANYL CITRATE 50 MCG: 50 INJECTION, SOLUTION INTRAMUSCULAR; INTRAVENOUS at 13:21

## 2020-09-03 NOTE — ANESTHESIA POSTPROCEDURE EVALUATION
Patient: Nicholas Post    Procedure Summary     Date:  09/03/20 Room / Location:   [facility]  [facility]    Anesthesia Start:  1318 Anesthesia Stop:      Procedure:  LUMBAR FUSION DECOMPRESSON WITH PEDICLE SCREWS L4-5 (N/A Spine Lumbar) Diagnosis:       Spondylolisthesis, acquired      (Spondylolisthesis, acquired [M43.10])    Surgeon:  Campbell Murillo MD Provider:  Demian Garcia MD    Anesthesia Type:  general ASA Status:  2          Anesthesia Type: general    Vitals  Vitals Value Taken Time   BP 96/56 9/3/2020  4:27 PM   Temp 98.1 °F (36.7 °C) 9/3/2020  4:27 PM   Pulse 77 9/3/2020  4:31 PM   Resp 14 9/3/2020  4:27 PM   SpO2 95 % 9/3/2020  4:31 PM   Vitals shown include unvalidated device data.        Post Anesthesia Care and Evaluation    Patient location during evaluation: PACU  Patient participation: complete - patient cannot participate  Level of consciousness: responsive to physical stimuli  Pain score: 0  Pain management: adequate  Airway patency: patent  Anesthetic complications: No anesthetic complications    Cardiovascular status: stable  Respiratory status: acceptable, nasal cannula, oral airway and spontaneous ventilation  Hydration status: stable    Comments: Pt transferred to PACU with O2. Vital signs stable. Report to PACU RN and care accepted.

## 2020-09-03 NOTE — OP NOTE
NEUROSURGICAL OPERATIVE NOTE        PREOPERATIVE DIAGNOSIS:    L4-5 spondylolisthesis, stenosis, instability      POSTOPERATIVE DIAGNOSIS:  Same      PROCEDURE:  1.  Arthrodesis interbody type L4-5  2.  L4-5 laminectomy with partial facetectomies and foraminotomies  3.  Bilateral L4-5 discectomy with bilateral Capstone Cage placement  4.  Nonsegmental pedicle screw fixation L4-5 utilizing PEEK rods  5.  Use of [place] and local autograft.  6.  Stealth frameless stereotaxy utilized in conjunction with O arm imaging      SURGEON:  Campbell Murillo M.D.      ASSISTANT: Michelle Light PA-C    PAC assisted with:   Suctioning   Retraction   Tying   Suturing   Closing   Application of dressing   Skilled neurosurgery PA assistance was necessary to perform this procedure.        ANESTHESIA:  General      ESTIMATED BLOOD LOSS: 100 mL      SPECIMEN: None      DRAINS: Hemovac      COMPLICATIONS:  None      CLINICAL NOTE:  The patient is a 75 old gentleman with progressive low back pain that predominantly involves his right leg.  Nonoperative measures in time have been unsuccessful.  Studies demonstrate spondylolisthesis at L4-5 with high-grade stenosis and evidence of instability.  As such, he presents at this time for lumbar decompression with fusion and stabilization.  The nature of the procedure as well as the potential risks, complications, limitations, and alternatives to the procedure were discussed at length with the patient and the patient has agreed to proceed with surgery.      TECHNICAL NOTE:  The patient was brought to the operating room and while on his cart, general endotracheal anesthesia was achieved.  He was then turned prone onto the Mayank table. Special care was ensured to protect pressure points. His low back was prepared and draped in the usual fashion. A localizing radiograph was obtained with the spinal needle in the lumbosacral midline.  Based on this a 7 cm vertical incision was fashioned overlying  the L4-5 posterior elements. Underlying tissues were divided with cautery to provide exposure to the bony anatomy.  Reference frame was affixed to the spinous process. O-arm imaging ensued. These images were downloaded into the [employer]. Using Stealth frameless stereotaxy each of the pedicle screw hole sites at L4 and L5 bilaterally were marked, drilled and tapped; 6.5 mm x 55 mm length screws were used at L4, screws at L5 were 6.5 mm x 50 mm.  Leksell rongeur was then utilized to remove the spinous process at L4 and the upper aspect of L5. The drill and Kerrison punches were used to widen this and partially dissect the facet complexes.  Markedly overgrown facet complex and ligament were removed to free up the L4 and L5 nerve roots.   The C-arm was brought into use and beginning on the right the disk at L4-5 was incised and evacuated piecemeal with an array of rongeurs, curettes and punches. Serial impactors were impacted into the disc space. Ultimately a 10 x 22 mm Capstone cage packed with a combination of Hernandez and local autograft was impacted into the disc space under fluoroscopic guidance. The procedure was repeated on the contralateral side with a 10 x 22 mm cage.  Prior to placing the 2nd cage, some of the fusion material was placed anteriorly and in the midline. PEEK rods measuring 30 mm were applied to the screw heads on each side. Set screws were applied.  Mild compression was performed across the disc space.  The set screws were tightened and broken off per routine.  Completion fluoroscopy confirmed good position of the construct of L4-5 with substantial reduction of the listhesis.  The wound was washed out with a saline solution. A Hemovac drain was brought in through a separate stab incision and left in the epidural space. Vancomycin powder was sprinkled in the depths and then more superficially as the wound was closed.  The paraspinous muscle and fascia were reapproximated in an interrupted  fashion with 0 Vicryl suture; 0.25% Marcaine was instilled into the paraspinous musculature and subcutaneous tissues. The subcutaneous tissues were closed in layers with 2-0 followed by 3-0 Vicryl suture. The skin was closed in a running subcuticular fashion with 3-0 Vicryl suture. Steri-Strips and a sterile dressing were applied. He was rolled onto his cart, extubated and taken to the recovery room in satisfactory condition. He did receive preoperative antibiotics.              Campbell Murillo M.D.

## 2020-09-03 NOTE — ANESTHESIA PREPROCEDURE EVALUATION
Anesthesia Evaluation     Patient summary reviewed and Nursing notes reviewed   no history of anesthetic complications:  NPO Solid Status: > 8 hours  NPO Liquid Status: > 8 hours           Airway   Mallampati: II  TM distance: >3 FB  Neck ROM: full  No difficulty expected  Dental      Pulmonary - negative pulmonary ROS and normal exam   Cardiovascular - normal exam    (+) hypertension,       Neuro/Psych- negative ROS  GI/Hepatic/Renal/Endo    (+)  GERD,      Musculoskeletal     (+) back pain,   Abdominal    Substance History      OB/GYN          Other   arthritis,                      Anesthesia Plan    ASA 2     general     intravenous induction     Anesthetic plan, all risks, benefits, and alternatives have been provided, discussed and informed consent has been obtained with: patient.    Plan discussed with CRNA.

## 2020-09-03 NOTE — H&P
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  He has done well.  Since October he has had low back pain that extends into the right buttock and into the side of the right calf.  He has had some sensory alteration in the right foot.  He is now also complaining of some tingling in the side of his ankle on the right. There is no one position that makes him better or worse.  He did some chiropractic and then saw an orthopedic doctor in [place], [place].  From what I can gather he had a couple of epidural injections performed which were not helpful.  Lifting also makes his symptoms worse.  Neurontin has not been particularly helpful.  He has been taking that regularly.  He was unable to get his x-rays.    9/3/20:  Here today to undergo PLIF L4-5    Review of Systems:  General ROS: negative for chills, fever or skin lesions;  No changes since last office visit.  Neg for recent sick exposure  Cardiovascular ROS: no chest pain or dyspnea on exertion  Respiratory ROS: no cough, shortness of breath, or wheezing    Allergies: No Known Allergies    Home Meds:    No current facility-administered medications on file prior to encounter.      Current Outpatient Medications on File Prior to Encounter   Medication Sig Dispense Refill   • HYDROcodone-acetaminophen (NORCO)  MG per tablet Take 1 tablet by mouth 2 (Two) Times a Day As Needed for Moderate Pain . 50 tablet 0   • traZODone (DESYREL) 100 MG tablet Take 300 mg by mouth Every Night.  1   • acyclovir (ZOVIRAX) 200 MG capsule Take 200 mg by mouth 4 (Four) Times a Day As Needed (herpes flair up).         PMH:   Past Medical History:   Diagnosis Date   • Back pain    • GERD (gastroesophageal reflux disease)    • Herpes    • Hypertension    • Osteoarthritis    • Wears reading eyeglasses       PSH:    Past Surgical History:   Procedure Laterality Date   • BACK SURGERY  1990's    Lumbar discectomy-     • COLONOSCOPY     • HAND SURGERY Left     pin   • HERNIA REPAIR     • KNEE ARTHROSCOPY Left    • ROTATOR CUFF REPAIR Bilateral    • WISDOM TOOTH EXTRACTION       Social History:   Tobacco:   Social History     Tobacco Use   Smoking Status Never Smoker   Smokeless Tobacco Never Used      Alcohol:     Social History     Substance and Sexual Activity   Alcohol Use Yes    Comment: 7 drinks weekly        Vitals:           /84 (BP Location: Right arm, Patient Position: Sitting)   Pulse 66   Temp 97.7 °F (36.5 °C) (Temporal)   Resp 18   SpO2 99%     Physical Exam:  General Appearance:    Alert, cooperative, no distress, appears stated age   Head:    Normocephalic, without obvious abnormality, atraumatic   Lungs:     Clear to auscultation bilaterally, respirations unlabored    Heart:   Regular rate and rhythm, S1 and S2 normal, no murmur, rub    or gallop    Abdomen:    Soft, nontender.  +bowel sounds   Breast Exam:    deferred   Genitalia:    deferred   Extremities:   Extremities normal, atraumatic, no cyanosis or edema   Skin:   Skin color, texture, turgor normal, no rashes or lesions   Neurologic:   Grossly intact   Results Review  LABS:  Lab Results   Component Value Date    WBC 7.04 09/01/2020    HGB 16.6 09/01/2020    HCT 51.4 (H) 09/01/2020    MCV 91.8 09/01/2020     09/01/2020    K 5.3 (H) 09/01/2020       RADIOLOGY:  Imaging Results (Last 72 Hours)     ** No results found for the last 72 hours. **          I reviewed the patient's new clinical results.    Cancer Staging (if applicable)  Cancer Patient: __ yes _x_no __unknown; If yes, clinical stage T:__ N:__M:__, stage group or __N/A    Impression/Plan:      Diagnosis:   1.  Lumbar radiculopathy.  2.  L4-5 spondylolisthesis and potential instability.  3.  Lumbar stenosis.     Treatment Options:   I discussed treatment options with  him.  Given his severe symptoms we are going to make arrangements for lumbar decompression with fusion and stabilization at L4-5.  I think that simple decompression is going to result in progressive instability given his MRI findings.  The nature of the procedure as well as the potential risks, complications, limitations, and alternatives to the procedure were discussed at length with the patient and the patient has agreed to proceed with surgery.    Georgia Cottrell, [place]   9/3/2020   12:50

## 2020-09-03 NOTE — ANESTHESIA PROCEDURE NOTES
Airway  Urgency: elective    Date/Time: 9/3/2020 1:22 PM  Airway not difficult    General Information and Staff    Patient location during procedure: OR  CRNA: Shorty Alan CRNA    Indications and Patient Condition  Indications for airway management: airway protection    Preoxygenated: yes  MILS not maintained throughout  Mask difficulty assessment: 1 - vent by mask    Final Airway Details  Final airway type: endotracheal airway      Successful airway: ETT  Cuffed: yes   Successful intubation technique: direct laryngoscopy  Endotracheal tube insertion site: oral  Blade: Carrillo  Blade size: 2  ETT size (mm): 7.5  Cormack-Lehane Classification: grade I - full view of glottis  Placement verified by: chest auscultation and capnometry   Measured from: lips  ETT/EBT  to lips (cm): 20  Number of attempts at approach: 1  Assessment: lips, teeth, and gum same as pre-op and atraumatic intubation    Additional Comments  Negative epigastric sounds, Breath sound equal bilaterally with symmetric chest rise and fall

## 2020-09-04 ENCOUNTER — TELEPHONE (OUTPATIENT)
Dept: NEUROSURGERY | Facility: CLINIC | Age: 75
End: 2020-09-04

## 2020-09-04 DIAGNOSIS — Z98.1 S/P LUMBAR FUSION: Primary | ICD-10-CM

## 2020-09-04 LAB
HCT VFR BLD AUTO: 43.1 % (ref 37.5–51)
HGB BLD-MCNC: 13.9 G/DL (ref 13–17.7)

## 2020-09-04 PROCEDURE — 97165 OT EVAL LOW COMPLEX 30 MIN: CPT

## 2020-09-04 PROCEDURE — 85014 HEMATOCRIT: CPT | Performed by: NEUROLOGICAL SURGERY

## 2020-09-04 PROCEDURE — 85018 HEMOGLOBIN: CPT | Performed by: NEUROLOGICAL SURGERY

## 2020-09-04 PROCEDURE — 97162 PT EVAL MOD COMPLEX 30 MIN: CPT

## 2020-09-04 PROCEDURE — 25010000003 CEFAZOLIN IN DEXTROSE 2-4 GM/100ML-% SOLUTION: Performed by: NEUROLOGICAL SURGERY

## 2020-09-04 PROCEDURE — 99024 POSTOP FOLLOW-UP VISIT: CPT | Performed by: NEUROLOGICAL SURGERY

## 2020-09-04 RX ORDER — HYDROCODONE BITARTRATE AND ACETAMINOPHEN 10; 325 MG/1; MG/1
1 TABLET ORAL 3 TIMES DAILY PRN
Qty: 25 TABLET | Refills: 0 | Status: SHIPPED | OUTPATIENT
Start: 2020-09-04 | End: 2020-09-05 | Stop reason: SDUPTHER

## 2020-09-04 RX ORDER — DOCUSATE SODIUM 100 MG/1
100 CAPSULE, LIQUID FILLED ORAL 2 TIMES DAILY
Status: DISCONTINUED | OUTPATIENT
Start: 2020-09-04 | End: 2020-09-05 | Stop reason: HOSPADM

## 2020-09-04 RX ORDER — POLYETHYLENE GLYCOL 3350 17 G/17G
17 POWDER, FOR SOLUTION ORAL DAILY
Status: DISCONTINUED | OUTPATIENT
Start: 2020-09-04 | End: 2020-09-05 | Stop reason: HOSPADM

## 2020-09-04 RX ADMIN — PANTOPRAZOLE SODIUM 40 MG: 40 TABLET, DELAYED RELEASE ORAL at 05:46

## 2020-09-04 RX ADMIN — DOCUSATE SODIUM 100 MG: 100 CAPSULE, LIQUID FILLED ORAL at 21:39

## 2020-09-04 RX ADMIN — IBUPROFEN 600 MG: 600 TABLET, FILM COATED ORAL at 21:40

## 2020-09-04 RX ADMIN — GABAPENTIN 300 MG: 300 CAPSULE ORAL at 21:40

## 2020-09-04 RX ADMIN — HYDROCODONE BITARTRATE AND ACETAMINOPHEN 1 TABLET: 7.5; 325 TABLET ORAL at 09:33

## 2020-09-04 RX ADMIN — TRAZODONE HYDROCHLORIDE 300 MG: 100 TABLET ORAL at 21:40

## 2020-09-04 RX ADMIN — GABAPENTIN 300 MG: 300 CAPSULE ORAL at 05:47

## 2020-09-04 RX ADMIN — POLYETHYLENE GLYCOL 3350 17 G: 17 POWDER, FOR SOLUTION ORAL at 11:58

## 2020-09-04 RX ADMIN — DOCUSATE SODIUM 100 MG: 100 CAPSULE, LIQUID FILLED ORAL at 11:58

## 2020-09-04 RX ADMIN — CEFAZOLIN SODIUM 2 G: 2 INJECTION, SOLUTION INTRAVENOUS at 05:46

## 2020-09-04 RX ADMIN — ACETAMINOPHEN 650 MG: 325 TABLET, FILM COATED ORAL at 21:40

## 2020-09-04 RX ADMIN — HYDROCODONE BITARTRATE AND ACETAMINOPHEN 1 TABLET: 7.5; 325 TABLET ORAL at 02:52

## 2020-09-04 RX ADMIN — LISINOPRIL 10 MG: 10 TABLET ORAL at 21:39

## 2020-09-04 RX ADMIN — IBUPROFEN 600 MG: 600 TABLET, FILM COATED ORAL at 15:49

## 2020-09-04 RX ADMIN — IBUPROFEN 600 MG: 600 TABLET, FILM COATED ORAL at 09:34

## 2020-09-04 NOTE — PLAN OF CARE
Problem: Patient Care Overview  Goal: Plan of Care Review  Outcome: Ongoing (interventions implemented as appropriate)  Flowsheets (Taken 9/4/2020 2387)  Outcome Summary: Pt currently declined to perform txfrs, mobility this date. Pt. expected min assist for LB ADLs. Provided AE to assist with education. Pt will be discharging home with SO at discharge.

## 2020-09-04 NOTE — PLAN OF CARE
Problem: Patient Care Overview  Goal: Plan of Care Review  Outcome: Ongoing (interventions implemented as appropriate)  Flowsheets (Taken 9/4/2020 6461)  Progress: improving  Plan of Care Reviewed With: patient     Pt;s pain well controlled with prn lortab.  Pt did not rest well this shift even after receiving his 300mg trazadone dose.  Has ambulated in hallway. Tolerated well. Lumbar dressing stained,. Hemovac intact. Cont with tingling to left foot. Vs noted.

## 2020-09-04 NOTE — PLAN OF CARE
Problem: Patient Care Overview  Goal: Plan of Care Review  Flowsheets  Taken 9/4/2020 0836  Progress: improving  Plan of Care Reviewed With: patient  Taken 9/4/2020 0832  Outcome Summary: Patient able to log roll with cues and ambulate in hallway 600 feet. Back education initiated. Will continue skilled PT

## 2020-09-04 NOTE — THERAPY EVALUATION
Patient Name: Nicholas Post  : 1945    MRN: [id number]                              Today's Date: 2020       Admit Date: 9/3/2020    Visit Dx:     ICD-10-CM ICD-9-CM   1. Spondylolisthesis, acquired M43.10 738.4   2. Lumbar radiculopathy M54.16 724.4     Patient Active Problem List   Diagnosis   • Spondylolisthesis, acquired     Past Medical History:   Diagnosis Date   • Back pain    • GERD (gastroesophageal reflux disease)    • Herpes    • Hypertension    • Osteoarthritis    • Wears reading eyeglasses      Past Surgical History:   Procedure Laterality Date   • BACK SURGERY      Lumbar discectomy-     • COLONOSCOPY     • HAND SURGERY Left     pin   • HERNIA REPAIR     • KNEE ARTHROSCOPY Left    • ROTATOR CUFF REPAIR Bilateral    • WISDOM TOOTH EXTRACTION       General Information     Row Name 20          PT Evaluation Time/Intention    Document Type  evaluation  -SC     Mode of Treatment  physical therapy  -SC     Row Name 20 08          General Information    Patient Profile Reviewed?  yes  -SC     Prior Level of Function  independent: some falls  -SC     Existing Precautions/Restrictions  spinal  -SC     Row Name 20 08          Relationship/Environment    Lives With  significant other  -SC     Row Name 20 08          Resource/Environmental Concerns    Current Living Arrangements  home/apartment/condo  -SC     Row Name 20 08          Home Main Entrance    Number of Stairs, Main Entrance  none  -SC     Row Name 20 08          Stairs Within Home, Primary    Stairs, Within Home, Primary  to basement  -SC     Row Name 20 08          Cognitive Assessment/Intervention- PT/OT    Orientation Status (Cognition)  oriented x 4  -SC     Cognitive Assessment/Intervention Comment  alert, following commands  -SC     Row Name 20 08          Safety Issues, Functional Mobility    Safety Issues Affecting Function (Mobility)  insight into  deficits/self awareness  -SC     Impairments Affecting Function (Mobility)  pain  -SC       User Key  (r) = Recorded By, (t) = Taken By, (c) = Cosigned By    Initials Name Provider Type    SC Nicolasa Calalhan PT Physical Therapist        Mobility     Row Name 09/04/20 0828          Bed Mobility Assessment/Treatment    Bed Mobility Assessment/Treatment  supine-sit;scooting/bridging  -SC     Scooting/Bridging [place] (Bed Mobility)  independent  -SC     Supine-Sit [place] (Bed Mobility)  verbal cues;minimum assist (75% patient effort)  -SC     Comment (Bed Mobility)  cues for log rolling without railing. Demonstrated some difficulty 2 to pain.  -SC     Row Name 09/04/20 0828          Transfer Assessment/Treatment    Comment (Transfers)  cues to sit slowly  -SC     Row Name 09/04/20 0828          Sit-Stand Transfer    Sit-Stand [place] (Transfers)  supervision;verbal cues  -SC     Row Name 09/04/20 0828          Gait/Stairs Assessment/Training    Gait/Stairs Assessment/Training  gait/ambulation independence  -SC     [place] Level (Gait)  verbal cues;supervision  -SC     Distance in Feet (Gait)  600  -SC     Pattern (Gait)  swing-through  -SC     Deviations/Abnormal Patterns (Gait)  juan antonio decreased;base of support, wide  -SC     Comment (Gait/Stairs)  Initially used walker and progressed off walker. Demosntrated slow careful ambuation. Some antalgic wt shifting noted 2 to knee stiffness  -SC       User Key  (r) = Recorded By, (t) = Taken By, (c) = Cosigned By    Initials Name Provider Type    SC Nicolasa Callahan PT Physical Therapist        Obj/Interventions     Row Name 09/04/20 0830          General ROM    GENERAL ROM COMMENTS  B knee stiffness noted  -SC     Row Name 09/04/20 0830          MMT (Manual Muscle Testing)    General MMT Comments  B LE grossly 4+/5  -SC     Row Name 09/04/20 0830          Therapeutic Exercise    Upper Extremity Range of Motion (Therapeutic Exercise)  shoulder  flexion/extension, bilateral  -SC     Lower Extremity (Therapeutic Exercise)  heel slides, bilateral;gluteal sets;quad sets, left bent knee fall offs  -SC     Lower Extremity Range of Motion (Therapeutic Exercise)  ankle dorsiflexion/plantar flexion, bilateral  -SC     Core Strength (Therapeutic Exercise)  abdominal bracing  -SC     Exercise Type (Therapeutic Exercise)  AROM (active range of motion);isometric contraction, static  -SC     Position (Therapeutic Exercise)  supine  -SC     Sets/Reps (Therapeutic Exercise)  10  -SC     Expected Outcome (Therapeutic Exercise)  facilitate normal movement patterns  -SC     Comment (Therapeutic Exercise)  cues for technique  -SC     Row Name 09/04/20 0830          Dynamic Standing Balance    Level of [place], Reaches Outside Midline (Standing, Dynamic Balance)  supervision  -SC     Row Name 09/04/20 0830          Sensory Assessment/Intervention    Sensory General Assessment  -- + toe numbness  -SC       User Key  (r) = Recorded By, (t) = Taken By, (c) = Cosigned By    Initials Name Provider Type    SC Nicolasa Callahan A, PT Physical Therapist        Goals/Plan     Row Name 09/04/20 0834          Bed Mobility Goal 1 (PT)    Activity/Assistive Device (Bed Mobility Goal 1, PT)  bed mobility activities, all  -SC     [place] Level/Cues Needed (Bed Mobility Goal 1, PT)  independent  -SC     Time Frame (Bed Mobility Goal 1, PT)  long term goal (LTG);10 days  -SC     Row Name 09/04/20 0834          Transfer Goal 1 (PT)    Activity/Assistive Device (Transfer Goal 1, PT)  transfers, all  -SC     [place] Level/Cues Needed (Transfer Goal 1, PT)  independent  -SC     Time Frame (Transfer Goal 1, PT)  long term goal (LTG);10 days  -SC     Row Name 09/04/20 0834          Gait Training Goal 1 (PT)    Activity/Assistive Device (Gait Training Goal 1, PT)  gait (walking locomotion)  -SC     [place] Level (Gait Training Goal 1, PT)  independent  -SC     Distance (Gait Goal 1,  PT)  1000  -SC     Time Frame (Gait Training Goal 1, PT)  long term goal (LTG);10 days  -SC     Row Name 09/04/20 0834          Patient Education Goal (PT)    Activity (Patient Education Goal, PT)  back precautions  -SC     [place]/Cues/Accuracy (Memory Goal 2, PT)  demonstrates adequately;verbalizes understanding  -SC     Time Frame (Patient Education Goal, PT)  10 days;long term goal (LTG)  -SC     Progress/Outcome (Patient Education Goal, PT)  good progress toward goal  -SC       User Key  (r) = Recorded By, (t) = Taken By, (c) = Cosigned By    Initials Name Provider Type    SC Nicolasa Callahan, PT Physical Therapist        Clinical Impression     Row Name 09/04/20 0832          Pain Assessment    Additional Documentation  Pain Scale: FACES Pre/Post-Treatment (Group)  -SC     Row Name 09/04/20 0832          Pain Scale: Numbers Pre/Post-Treatment    Pain Location - Orientation  lower  -SC     Pain Location  back  -SC     Pain Intervention(s)  Repositioned  -SC     Row Name 09/04/20 0832          Pain Scale: FACES Pre/Post-Treatment    Pain: FACES Scale, Pretreatment  2-->hurts little bit  -SC     Pain: FACES Scale, Post-Treatment  2-->hurts little bit  -SC     Pre/Post Treatment Pain Comment  L side more  -SC     Row Name 09/04/20 0832          Plan of Care Review    Plan of Care Reviewed With  patient  -SC     Progress  improving  -SC     Outcome Summary  Patient able to log roll wt cues and ambulate in hallway 600 feet. Back education initialted. Will continue skilled PT  -SC     Row Name 09/04/20 0832          Physical Therapy Clinical Impression    Patient/Family Goals Statement (PT Clinical Impression)  go home  -SC     Criteria for Skilled Interventions Met (PT Clinical Impression)  yes;treatment indicated  -SC     Rehab Potential (PT Clinical Summary)  good, to achieve stated therapy goals  -SC     Row Name 09/04/20 0832          Positioning and Restraints    Pre-Treatment Position  in bed  -SC      Post Treatment Position  chair  -SC     In Chair  reclined;call light within reach;encouraged to call for assist  -SC       User Key  (r) = Recorded By, (t) = Taken By, (c) = Cosigned By    Initials Name Provider Type    Nicolasa Chu PT Physical Therapist        Outcome Measures     Row Name 09/04/20 0835          How much help from another person do you currently need...    Turning from your back to your side while in flat bed without using bedrails?  4  -SC     Moving from lying on back to sitting on the side of a flat bed without bedrails?  4  -SC     Moving to and from a bed to a chair (including a wheelchair)?  3  -SC     Standing up from a chair using your arms (e.g., wheelchair, bedside chair)?  3  -SC     Climbing 3-5 steps with a railing?  3  -SC     To walk in hospital room?  3  -SC     AM-PAC 6 Clicks Score (PT)  20  -SC     Row Name 09/04/20 0835          Functional Assessment    Outcome Measure Options  AM-PAC 6 Clicks Basic Mobility (PT)  -SC       User Key  (r) = Recorded By, (t) = Taken By, (c) = Cosigned By    Initials Name Provider Type    Nicolasa Chu PT Physical Therapist        Physical Therapy Education                 Title: PT OT SLP Therapies (Done)     Topic: Physical Therapy (Done)     Point: Mobility training (Done)     Description:   Instruct learner(s) on safety and technique for assisting patient out of bed, chair or wheelchair.  Instruct in the proper use of assistive devices, such as walker, crutches, cane or brace.              Patient Friendly Description:   It's important to get you on your feet again, but we need to do so in a way that is safe for you. Falling has serious consequences, and your personal safety is the most important thing of all.        When it's time to get out of bed, one of us or a family member will sit next to you on the bed to give you support.     If your doctor or nurse tells you to use a walker, crutches, a cane, or a brace, be sure you use  it every time you get out of bed, even if you think you don't need it.    Learning Progress Summary           Patient Eager, E,TB,D,H, NR,VU by SC at 9/4/2020 0835    Comment:  reviewed HEP, back precautions and log rolling                   Point: Home exercise program (Done)     Description:   Instruct learner(s) on appropriate technique for monitoring, assisting and/or progressing patient with therapeutic exercises and activities.              Learning Progress Summary           Patient Eager, E,TB,D,H, NR,VU by SC at 9/4/2020 0835    Comment:  reviewed HEP, back precautions and log rolling                   Point: Body mechanics (Done)     Description:   Instruct learner(s) on proper positioning and spine alignment for patient and/or caregiver during mobility tasks and/or exercises.              Learning Progress Summary           Patient Eager, E,TB,D,H, NR,VU by SC at 9/4/2020 0835    Comment:  reviewed HEP, back precautions and log rolling                   Point: Precautions (Done)     Description:   Instruct learner(s) on prescribed precautions during mobility and gait tasks              Learning Progress Summary           Patient Eager, E,TB,D,H, NR,VU by SC at 9/4/2020 0835    Comment:  reviewed HEP, back precautions and log rolling                               User Key     Initials Effective Dates Name Provider Type Discipline    SC 06/19/15 -  Nicolasa Callahan, PT Physical Therapist PT              PT Recommendation and Plan     Outcome Summary/Treatment Plan (PT)  Anticipated Discharge Disposition (PT): home with assist  Plan of Care Reviewed With: patient  Progress: improving  Outcome Summary: Patient able to log roll wth cues and ambulate in hallway 600 feet. Back education initialted. Will continue skilled PT     Time Calculation:   PT Charges     Row Name 09/04/20 0738             Time Calculation    Start Time  0738  -SC      PT Received On  09/04/20  -SC      PT Goal Re-Cert Due Date  09/14/20   -SC        User Key  (r) = Recorded By, (t) = Taken By, (c) = Cosigned By    Initials Name Provider Type    SC Nicolasa Callahan PT Physical Therapist        Therapy Charges for Today     Code Description Service Date Service Provider Modifiers Qty    66949049915 HC PT EVAL MOD COMPLEXITY 4 9/4/2020 Nicolasa Callahan PT GP 1          PT G-Codes  Outcome Measure Options: AM-PAC 6 Clicks Basic Mobility (PT)  AM-PAC 6 Clicks Score (PT): 20    Nicolasa Callahan PT  9/4/2020

## 2020-09-04 NOTE — TELEPHONE ENCOUNTER
Can someone please place an order for AP and Lateral lumbar spine x rays for this patients follow up?

## 2020-09-04 NOTE — PROGRESS NOTES
"NEUROSURGERY PROGRESS NOTE     LOS: 1 day   Patient Care Team:  Kassandra Manley APRN as PCP - General (Family Medicine)    Chief Complaint: Back and right leg pain.    POD#: 1 Day Post-Op  Procedures:  L4-5 PLIF.    Interval History:   The patient is voiding independently and has already walked extensively in the [place].  He did not sleep due to the IV machine noise.  Patient Complaints: Mild incisional pain.  Patient Denies: Preoperative claudication symptoms.    Vital Signs: Blood pressure 103/61, pulse 83, temperature 98.2 °F (36.8 °C), temperature source Oral, resp. rate 16, height 182.9 cm (72.01\"), weight 88.2 kg (194 lb 7.1 oz), SpO2 94 %.  Intake/Output:     Intake/Output Summary (Last 24 hours) at 9/4/2020 0627  Last data filed at 9/4/2020 0605  Gross per 24 hour   Intake 2000 ml   Output 1400 ml   Net 600 ml     Drain output: 150 mL.    Physical Exam:  The patient is awake and alert.  He is sitting up in bed and reading a book.  He follows all commands.  Dry dressing is in place on his incision.  Motor function is intact in his lower extremities.     Data Review:    Results from last 7 days   Lab Units 09/01/20  1415   WBC 10*3/mm3 7.04   HEMOGLOBIN g/dL 16.6   HEMATOCRIT % 51.4*   PLATELETS 10*3/mm3 249     Postop H&H pending.    Assessment/Plan:  1.  L4-5 spondylolisthesis, stenosis, instability status post decompression, fusion, and stabilization.   2.  History of hypertension.  3.  Disposition: Mobilize patient.  The patient wants to go home.  Continue drain for now.  Home tomorrow if diminished drain output.  Rx on chart.  Orders placed.  He will follow-up with DARLIN in the office in approximately 3 weeks.      Campbell Murillo MD  09/04/20  06:27    "

## 2020-09-04 NOTE — THERAPY EVALUATION
Acute Care - Occupational Therapy Initial Evaluation  [facility]     Patient Name: Nicholas Post  : 1945  MRN: [id number]  Today's Date: 2020             Admit Date: 9/3/2020       ICD-10-CM ICD-9-CM   1. Impaired mobility and ADLs Z74.09 V49.89    Z78.9    2. Spondylolisthesis, acquired M43.10 738.4   3. Lumbar radiculopathy M54.16 724.4     Patient Active Problem List   Diagnosis   • Spondylolisthesis, acquired     Past Medical History:   Diagnosis Date   • Back pain    • GERD (gastroesophageal reflux disease)    • Herpes    • Hypertension    • Osteoarthritis    • Wears reading eyeglasses      Past Surgical History:   Procedure Laterality Date   • BACK SURGERY      Lumbar discectomy-     • COLONOSCOPY     • HAND SURGERY Left     pin   • HERNIA REPAIR     • KNEE ARTHROSCOPY Left    • LUMBAR LAMINECTOMY WITH FUSION N/A 9/3/2020    Procedure: LUMBAR FUSION DECOMPRESSON WITH PEDICLE SCREWS L4-5;  Surgeon: Campbell Murillo MD;  Location: [facility];  Service: Neurosurgery;  Laterality: N/A;   • ROTATOR CUFF REPAIR Bilateral    • WISDOM TOOTH EXTRACTION            OT ASSESSMENT FLOWSHEET (last 12 hours)      Occupational Therapy Evaluation     Row Name 20 0944                   OT Evaluation Time/Intention    Subjective Information  no complaints  -AN        Document Type  evaluation  -AN        Mode of Treatment  occupational therapy  -AN        Patient Effort  adequate  -AN        Symptoms Noted During/After Treatment  none  -AN        Comment  Pt declined to get OOB this date  -AN           General Information    Patient Profile Reviewed?  yes  -AN        Patient Observations  alert;cooperative;agree to therapy  -AN        Patient/Family Observations  no family present  -AN        Prior Level of Function  independent:;all household mobility;transfer;gait;ADL's;driving  -AN        Pertinent History of Current Functional Problem  s/p PLIF  -AN        Existing  Precautions/Restrictions  fall;spinal  -AN        Equipment Issued to Patient  bathing equipment;long handled sponge;reacher;dressing equipment;elastic shoe laces;sock aid  -AN        Risks Reviewed  patient:;LOB;dizziness;increased discomfort;change in vital signs  -AN        Benefits Reviewed  patient:;improve function;increase independence;increase strength;increase balance  -AN        Barriers to Rehab  none identified  -AN           Relationship/Environment    Primary Source of Support/Comfort  significant other  -AN        Lives With  significant other  -AN        Family Caregiver if Needed  significant other  -AN           Resource/Environmental Concerns    Current Living Arrangements  home/apartment/condo  -AN           Home Main Entrance    Number of Stairs, Main Entrance  two  -AN           Stairs Within Home, Primary    Stairs, Within Home, Primary  12  -AN        Stairs Comment, Within Home, Primary  to basement  -AN           Cognitive Assessment/Interventions    Additional Documentation  Cognitive Assessment/Intervention (Group)  -AN           Cognitive Assessment/Intervention- PT/OT    Affect/Mental Status (Cognitive)  WFL  -AN        Orientation Status (Cognition)  oriented x 3  -AN        Follows Commands (Cognition)  WFL  -AN        Personal Safety Interventions  fall prevention program maintained  -AN           Bed Mobility Assessment/Treatment    Comment (Bed Mobility)  pt declined  -AN           Functional Mobility    Functional Mobility- Comment  pt declined  -AN           Transfer Assessment/Treatment    Comment (Transfers)  pt declined  -AN           ADL Assessment/Intervention    BADL Assessment/Intervention  bathing;lower body dressing;toileting  -AN           Bathing Assessment/Intervention    Comment (Bathing)  provided long sponge with education to use within spinal precautions  -AN           Lower Body Dressing Assessment/Training    Lower Body Dressing [place] Level   doff;don;socks;minimum assist (75% patient effort)  -AN        Assistive Devices (Lower Body Dressing)  reacher;sock-aid  -AN        Comment (Lower Body Dressing)  provided simulation/education for use of reacher for LB dressing; discussed other AE available if needed  -AN           Toileting Assessment/Training    Comment (Toileting)  offered toilet aide and education for use, pt declined at this time  -AN           BADL Safety/Performance    Impairments, BADL Safety/Performance  other (see comments)  -AN           General ROM    GENERAL ROM COMMENTS  BUE WFL  -AN           Motor Assessment/Interventions    Additional Documentation  Balance (Group);Gross Motor Coordination (Group);Therapeutic Exercise (Group)  -AN           Sensory Assessment/Intervention    Sensory General Assessment  no sensation deficits identified  -AN           Positioning and Restraints    Pre-Treatment Position  in bed  -AN        Post Treatment Position  bed  -AN        In Bed  supine;call light within reach;encouraged to call for assist;exit alarm on  -AN           Pain Assessment    Additional Documentation  Pain Scale: Numbers Pre/Post-Treatment (Group)  -AN           Pain Scale: Numbers Pre/Post-Treatment    Pain Scale: Numbers, Pretreatment  7/10  -AN        Pain Scale: Numbers, Post-Treatment  7/10  -AN        Pain Location  back  -AN        Pain Intervention(s)  Rest  -AN           Wound 09/03/20 1337 midline;lower back Incision    Wound - Properties Group Date first assessed: 09/03/20  -WS Time first assessed: 1337  -WS Present on Hospital Admission: N  -WS Orientation: midline;lower  -WS Location: back  -WS Primary Wound Type: Incision  -WS       Clinical Impression (OT)    OT Diagnosis  impaired ADLs  -AN        Patient/Family Goals Statement (OT Eval)  agreeable to OT  -AN        Criteria for Skilled Therapeutic Interventions Met (OT Eval)  yes;treatment indicated  -AN        Rehab Potential (OT Eval)  good, to achieve stated  therapy goals  -AN        Therapy Frequency (OT Eval)  daily  -AN        Care Plan Review (OT)  evaluation/treatment results reviewed  -AN        Anticipated Discharge Disposition (OT)  home with assist  -AN           OT Goals    Transfer Goal Selection (OT)  transfer, OT goal 1  -AN        Bathing Goal Selection (OT)  bathing, OT goal 1  -AN        Dressing Goal Selection (OT)  dressing, OT goal 2  -AN           Transfer Goal 1 (OT)    Activity/Assistive Device (Transfer Goal 1, OT)  toilet;bed-to-chair/chair-to-bed;sit-to-stand/stand-to-sit  -AN        [place] Level/Cues Needed (Transfer Goal 1, OT)  supervision required  -AN        Time Frame (Transfer Goal 1, OT)  10 days  -AN        Progress/Outcome (Transfer Goal 1, OT)  goal ongoing  -AN           Bathing Goal 1 (OT)    Activity/Assistive Device (Bathing Goal 1, OT)  lower body bathing;long-handled sponge;shower chair  -AN        [place] Level/Cues Needed (Bathing Goal 1, OT)  supervision required  -AN        Time Frame (Bathing Goal 1, OT)  10 days  -AN        Progress/Outcomes (Bathing Goal 1, OT)  goal ongoing  -AN           Dressing Goal 2 (OT)    Activity/Assistive Device (Dressing Goal 2, OT)  lower body dressing;reacher;sock-aid;long handled shoe horn;elastic laces  -AN        [place]/Cues Needed (Dressing Goal 2, OT)  supervision required;set-up required  -AN        Time Frame (Dressing Goal 2, OT)  10 days  -AN        Progress/Outcome (Dressing Goal 2, OT)  goal ongoing  -AN           Living Environment    Home Accessibility  stairs to enter home;stairs within home  -AN          User Key  (r) = Recorded By, (t) = Taken By, (c) = Cosigned By    Initials Name Effective Dates    AN Krysten Batista OT 06/22/15 -     Shagufta Hudson RN 08/25/17 -          Occupational Therapy Education                 Title: PT OT SLP Therapies (In Progress)     Topic: Occupational Therapy (In Progress)     Point: ADL training (Done)      Description:   Instruct learner(s) on proper safety adaptation and remediation techniques during self care or transfers.   Instruct in proper use of assistive devices.              Learning Progress Summary           Patient Acceptance, E,D, VU,DU,NR by AN at 9/4/2020 0944    Comment:  Educated on OT role, spinal precautions, AE for BADLs.                   Point: Home exercise program (Not Started)     Description:   Instruct learner(s) on appropriate technique for monitoring, assisting and/or progressing therapeutic exercises/activities.              Learner Progress:   Not documented in this visit.          Point: Precautions (Not Started)     Description:   Instruct learner(s) on prescribed precautions during self-care and functional transfers.              Learner Progress:   Not documented in this visit.          Point: Body mechanics (Not Started)     Description:   Instruct learner(s) on proper positioning and spine alignment during self-care, functional mobility activities and/or exercises.              Learner Progress:   Not documented in this visit.                      User Key     Initials Effective Dates Name Provider Type Discipline     06/22/15 -  Krysten Batista OT Occupational Therapist OT                  OT Recommendation and Plan  Outcome Summary/Treatment Plan (OT)  Anticipated Discharge Disposition (OT): home with assist  Therapy Frequency (OT Eval): daily  Outcome Summary: Pt currently declined to perform txfrs, mobility this date. Pt. expected min assist for LB ADLs. Provided AE to assist with education. Pt will be discharging home with SO at discharge.    Outcome Measures     Row Name 09/04/20 0944             How much help from another is currently needed...    Putting on and taking off regular lower body clothing?  2  -AN      Bathing (including washing, rinsing, and drying)  2  -AN      Toileting (which includes using toilet bed pan or urinal)  3  -AN      Putting on and taking off  regular upper body clothing  3  -AN      Taking care of personal grooming (such as brushing teeth)  4  -AN      Eating meals  4  -AN      AM-PAC 6 Clicks Score (OT)  18  -AN         Functional Assessment    Outcome Measure Options  AM-PAC 6 Clicks Daily Activity (OT)  -AN        User Key  (r) = Recorded By, (t) = Taken By, (c) = Cosigned By    Initials Name Provider Type    Krysten Leal OT Occupational Therapist          Time Calculation:   Time Calculation- OT     Row Name 09/04/20 1126             Time Calculation- OT    OT Start Time  0944  -AN      Total Timed Code Minutes- OT  0 minute(s)  -AN      OT Received On  09/04/20  -AN      OT Goal Re-Cert Due Date  09/14/20  -AN        User Key  (r) = Recorded By, (t) = Taken By, (c) = Cosigned By    Initials Name Provider Type    Krytsen Leal OT Occupational Therapist        Therapy Charges for Today     Code Description Service Date Service Provider Modifiers Qty    [id number]  OT EVAL LOW COMPLEXITY 4 9/4/2020 Krysten Batista OT GO 1    [id number]  OT THER SUPP EA 15 MIN 9/4/2020 Krysten Batista OT GO 1               Krysten Batista OT  9/4/2020

## 2020-09-04 NOTE — PROGRESS NOTES
Discharge Planning Assessment  [facility]     Patient Name: Nicholas Post  MRN: [id number]  Today's Date: 9/4/2020    Admit Date: 9/3/2020    Discharge Needs Assessment     Row Name 09/04/20 1128       Living Environment    Lives With  significant other    Name(s) of Who Lives With Patient  Trudy    Current Living Arrangements  home/apartment/condo    Family Caregiver if Needed  significant other    Quality of Family Relationships  helpful;involved;supportive    Able to Return to Prior Arrangements  yes       Resource/Environmental Concerns    Resource/Environmental Concerns  none    Transportation Concerns  car, none       Transition Planning    Patient/Family Anticipates Transition to  home with family    Patient/Family Anticipated Services at Transition  none    Transportation Anticipated  family or friend will provide       Discharge Needs Assessment    Readmission Within the Last 30 Days  no previous admission in last 30 days    Equipment Currently Used at Home  none    Equipment Needed After Discharge  none        Discharge Plan     Row Name 09/04/20 1129       Plan    Plan  Home with Significant other's assistance    Patient/Family in Agreement with Plan  yes    Plan Comments  Met with Mr. Post at the bedside for discharge planning.  Mr. Post lives with his significant other, Trudy, in a one story home in [place] [place].  He is ambulating independently and is independent with his ADL's.  He declines any DME or home health needs.  Mr. Post states that Trudy can assist him at home as needed and will be transporting him home when discharged.    CM will continue to follow.    Final Discharge Disposition Code  01 - home or self-care        Destination      Coordination has not been started for this encounter.      Durable Medical Equipment      Coordination has not been started for this encounter.      Dialysis/Infusion      Coordination has not been started for this encounter.      Home  Medical Care      Coordination has not been started for this encounter.      Therapy      Coordination has not been started for this encounter.      Community Resources      Coordination has not been started for this encounter.        Expected Discharge Date and Time     Expected Discharge Date Expected Discharge Time    Sep 5, 2020         Demographic Summary     Row Name 09/04/20 1126       General Information    Admission Type  inpatient    Arrived From  home    Reason for Consult  discharge planning    General Information Comments  PCP:  Kassandra Manley       Contact Information    Permission Granted to Share Info With      Contact Information Comments  Son:  Kike Post,  [phone]        Functional Status     Row Name 09/04/20 1128       Functional Status    Usual Activity Tolerance  good    Current Activity Tolerance  -- See PT notes.       Functional Status, IADL    Medications  independent    Meal Preparation  independent    Housekeeping  independent    Laundry  independent    Shopping  independent       Mental Status    General Appearance WDL  WDL        Psychosocial    No documentation.       Abuse/Neglect    No documentation.       Legal     Row Name 09/04/20 1128       Financial/Legal    Finance Comments  Mr. Post has prescription drug coverage with Medicare D plan.  Verified insurance with patient.        Substance Abuse    No documentation.       Patient Forms    No documentation.           Keri Menendez, RN

## 2020-09-05 VITALS
WEIGHT: 194.45 LBS | RESPIRATION RATE: 16 BRPM | OXYGEN SATURATION: 91 % | SYSTOLIC BLOOD PRESSURE: 103 MMHG | HEART RATE: 71 BPM | BODY MASS INDEX: 26.34 KG/M2 | DIASTOLIC BLOOD PRESSURE: 66 MMHG | HEIGHT: 72 IN | TEMPERATURE: 98.1 F

## 2020-09-05 PROCEDURE — 97116 GAIT TRAINING THERAPY: CPT

## 2020-09-05 PROCEDURE — 99024 POSTOP FOLLOW-UP VISIT: CPT | Performed by: PHYSICIAN ASSISTANT

## 2020-09-05 RX ORDER — HYDROCODONE BITARTRATE AND ACETAMINOPHEN 10; 325 MG/1; MG/1
1 TABLET ORAL 3 TIMES DAILY PRN
Qty: 30 TABLET | Refills: 0
Start: 2020-09-05

## 2020-09-05 RX ORDER — HYDROCODONE BITARTRATE AND ACETAMINOPHEN 10; 325 MG/1; MG/1
1 TABLET ORAL 3 TIMES DAILY PRN
Qty: 25 TABLET | Refills: 0
Start: 2020-09-05 | End: 2020-09-05 | Stop reason: SDUPTHER

## 2020-09-05 RX ADMIN — PANTOPRAZOLE SODIUM 40 MG: 40 TABLET, DELAYED RELEASE ORAL at 05:15

## 2020-09-05 RX ADMIN — IBUPROFEN 600 MG: 600 TABLET, FILM COATED ORAL at 08:14

## 2020-09-05 RX ADMIN — GABAPENTIN 300 MG: 300 CAPSULE ORAL at 05:15

## 2020-09-05 RX ADMIN — POLYETHYLENE GLYCOL 3350 17 G: 17 POWDER, FOR SOLUTION ORAL at 08:14

## 2020-09-05 RX ADMIN — DOCUSATE SODIUM 100 MG: 100 CAPSULE, LIQUID FILLED ORAL at 08:14

## 2020-09-05 RX ADMIN — SODIUM CHLORIDE, PRESERVATIVE FREE 3 ML: 5 INJECTION INTRAVENOUS at 08:18

## 2020-09-05 RX ADMIN — ACETAMINOPHEN 650 MG: 325 TABLET, FILM COATED ORAL at 08:14

## 2020-09-05 NOTE — PROGRESS NOTES
"NEUROSURGERY PROGRESS NOTE     LOS: 2 days   Patient Care Team:  Kassandra Manley APRN as PCP - General (Family Medicine)    Chief Complaint: Back and right leg pain.    POD#: 2 Days Post-Op  Procedures:  L4-5 PLIF.    Interval History:   Patient Complaints: Incisional discomfort.  Patient Denies: Preoperative leg pain.  He has no headache.    Vital Signs: Blood pressure 103/66, pulse 71, temperature 98.1 °F (36.7 °C), temperature source Oral, resp. rate 16, height 182.9 cm (72.01\"), weight 88.2 kg (194 lb 7.1 oz), SpO2 91 %.  Intake/Output:     Intake/Output Summary (Last 24 hours) at 9/5/2020 0820  Last data filed at 9/5/2020 0354  Gross per 24 hour   Intake --   Output 225 ml   Net -225 ml     Drain output: 150/175.    Physical Exam:  Patient is awake and alert.  He is sitting up in bed and appears comfortable.  Dry dressing is in place on his incision.     Data Review:    Results from last 7 days   Lab Units 09/04/20  0706 09/01/20  1415   WBC 10*3/mm3  --  7.04   HEMOGLOBIN g/dL 13.9 16.6   HEMATOCRIT % 43.1 51.4*   PLATELETS 10*3/mm3  --  249         Assessment/Plan:  1.  L4-5 spondylolisthesis, stenosis, instability status post decompression, fusion, and stabilization.   2.  History of hypertension.  3.  Disposition: Mobilize patient.  Drain out this afternoon and then home.      Campbell Murillo MD  09/05/20  08:20    "

## 2020-09-05 NOTE — NURSING NOTE
Pt. D/c'd home with assist of family.  Drain removed and new covaderm dsg applied via sterile technique. Instructions on incision care given.  IV removed.  Discharge packet and instructions given to patient with paper prescription for Hydrocodone.  Discharge education complete with no questions/concerns at this time.   Stephenie Nelson RN

## 2020-09-05 NOTE — PLAN OF CARE
Problem: Patient Care Overview  Goal: Plan of Care Review  Flowsheets (Taken 9/5/2020 1011)  Outcome Summary: Patient continues to demonstrate improvements in PT this AM. Performs bed mobility with CGA for log rolling technique, transfers without AD with supervision, & ambulates x 600' independently without loss of balance. HEP and low back precautions reviewed. Continue to recommend home at dc.

## 2020-09-05 NOTE — PLAN OF CARE
Problem: Patient Care Overview  Goal: Plan of Care Review  Flowsheets (Taken 9/5/2020 0402)  Progress: improving  Outcome Summary: Pt VSS. No c/o pain. Pt ambulates adlib and ambulated in [place]. Dressing to back CDI. Hemovac maintained suction with approx 175 ml of output.     Problem: Laminectomy/Foraminotomy/Discectomy (Adult)  Goal: Signs and Symptoms of Listed Potential Problems Will be Absent, Minimized or Managed (Laminectomy/Foraminotomy/Discectomy)  Flowsheets (Taken 9/5/2020 0402)  Problems Assessed (Laminectomy/Laminotomy/Discectomy): all  Problems Present (Laminectomy/otomy): none  Goal: Anesthesia/Sedation Recovery  Flowsheets (Taken 9/5/2020 0402)  Anesthesia/Sedation Recovery: recovered to baseline     Problem: Fall Risk (Adult)  Goal: Identify Related Risk Factors and Signs and Symptoms  Flowsheets (Taken 9/5/2020 0402)  Related Risk Factors (Fall Risk): history of falls  Signs and Symptoms (Fall Risk): presence of risk factors  Goal: Absence of Fall  Flowsheets (Taken 9/5/2020 0402)  Absence of Fall: making progress toward outcome

## 2020-09-05 NOTE — THERAPY TREATMENT NOTE
Patient Name: Nicholas Post  : 1945    MRN: [id number]                              Today's Date: 2020       Admit Date: 9/3/2020    Visit Dx:     ICD-10-CM ICD-9-CM   1. Impaired mobility and ADLs Z74.09 V49.89    Z78.9    2. Spondylolisthesis, acquired M43.10 738.4   3. Lumbar radiculopathy M54.16 724.4     Patient Active Problem List   Diagnosis   • Spondylolisthesis, acquired     Past Medical History:   Diagnosis Date   • Back pain    • GERD (gastroesophageal reflux disease)    • Herpes    • Hypertension    • Osteoarthritis    • Wears reading eyeglasses      Past Surgical History:   Procedure Laterality Date   • BACK SURGERY      Lumbar discectomy-     • COLONOSCOPY     • HAND SURGERY Left     pin   • HERNIA REPAIR     • KNEE ARTHROSCOPY Left    • LUMBAR LAMINECTOMY WITH FUSION N/A 9/3/2020    Procedure: LUMBAR FUSION DECOMPRESSON WITH PEDICLE SCREWS L4-5;  Surgeon: Campbell Murillo MD;  Location: [facility];  Service: Neurosurgery;  Laterality: N/A;   • ROTATOR CUFF REPAIR Bilateral    • WISDOM TOOTH EXTRACTION       General Information     Row Name 20 1011          PT Evaluation Time/Intention    Document Type  therapy note (daily note)  -LO     Mode of Treatment  physical therapy  -LO     Row Name 20 1011          General Information    Patient Profile Reviewed?  yes  -LO     Existing Precautions/Restrictions  fall;spinal  -LO     Row Name 20 1011          Cognitive Assessment/Intervention- PT/OT    Orientation Status (Cognition)  oriented x 3  -LO     Row Name 20 1011          Safety Issues, Functional Mobility    Impairments Affecting Function (Mobility)  pain  -LO       User Key  (r) = Recorded By, (t) = Taken By, (c) = Cosigned By    Initials Name Provider Type    LO Alanis Brush, PT Physical Therapist        Mobility     Row Name 20 1011          Bed Mobility Assessment/Treatment    Bed Mobility Assessment/Treatment   supine-sit;scooting/bridging  -LO     Scooting/Bridging [place] (Bed Mobility)  independent  -LO     Supine-Sit [place] (Bed Mobility)  contact guard;verbal cues  -LO     Comment (Bed Mobility)  vc for log rolling   -     Row Name 09/05/20 1011          Transfer Assessment/Treatment    Comment (Transfers)  vc for hand placement, supervision with transfers   -     Row Name 09/05/20 1011          Sit-Stand Transfer    Sit-Stand [place] (Transfers)  supervision;verbal cues  -     Row Name 09/05/20 1011          Gait/Stairs Assessment/Training    Gait/Stairs Assessment/Training  gait/ambulation independence  -LO     [place] Level (Gait)  verbal cues;supervision  -LO     Distance in Feet (Gait)  600'  -LO     Pattern (Gait)  swing-through  -LO     Deviations/Abnormal Patterns (Gait)  juan antonio decreased;base of support, wide  -LO     Comment (Gait/Stairs)  Able to ambulate safely without AD with supervision. Normalized gait pattern and speed.   -LO       User Key  (r) = Recorded By, (t) = Taken By, (c) = Cosigned By    Initials Name Provider Type    LO Alanis Brush, PT Physical Therapist        Obj/Interventions     Row Name 09/05/20 1011          Therapeutic Exercise    Lower Extremity Range of Motion (Therapeutic Exercise)  -- review of each HEP in HEP packet for post op back surgery.   -LO     Comment (Therapeutic Exercise)  minimal cuing required for technique.   -     Row Name 09/05/20 1011          Static Sitting Balance    Level of [place] (Unsupported Sitting, Static Balance)  independent  -LO     Sitting Position (Unsupported Sitting, Static Balance)  sitting on edge of bed  -LO     Time Able to Maintain Position (Unsupported Sitting, Static Balance)  2 to 3 minutes  -     Row Name 09/05/20 1011          Dynamic Sitting Balance    Level of [place], Reaches Outside Midline (Sitting, Dynamic Balance)  independent  -LO     Sitting Position, Reaches Outside Midline (Sitting,  Dynamic Balance)  sitting on edge of bed  -LO     Row Name 09/05/20 1011          Static Standing Balance    Level of [place] (Supported Standing, Static Balance)  supervision  -LO     Time Able to Maintain Position (Supported Standing, Static Balance)  1 to 2 minutes  -LO     Assistive Device Utilized (Supported Standing, Static Balance)  -- no AD  -LO     Row Name 09/05/20 1011          Dynamic Standing Balance    Level of [place], Reaches Outside Midline (Standing, Dynamic Balance)  supervision  -LO     Time Able to Maintain Position, Reaches Outside Midline (Standing, Dynamic Balance)  1 to 2 minutes  -LO     Assistive Device Utilized (Supported Standing, Dynamic Balance)  -- no AD  -LO     Row Name 09/05/20 1011          Sensory Assessment/Intervention    Sensory General Assessment  no sensation deficits identified  -       User Key  (r) = Recorded By, (t) = Taken By, (c) = Cosigned By    Initials Name Provider Type    Alanis Sky, PT Physical Therapist        Goals/Plan    No documentation.       Clinical Impression     [facility] Name 09/05/20 1011          Pain Scale: Numbers Pre/Post-Treatment    Pain Scale: Numbers, Pretreatment  2/10  -LO     Pain Scale: Numbers, Post-Treatment  2/10  -LO     Pain Location - Side  Bilateral  -LO     Pain Location - Orientation  lower  -LO     Pain Location  back  -LO     Pain Intervention(s)  Repositioned  -     Row Name 09/05/20 1011          Plan of Care Review    Plan of Care Reviewed With  patient  -     Progress  improving  -[facility] Name 09/05/20 1011          Physical Therapy Clinical Impression    Criteria for Skilled Interventions Met (PT Clinical Impression)  yes;treatment indicated  -LO     Rehab Potential (PT Clinical Summary)  good, to achieve stated therapy goals  -     Row Name 09/05/20 1011          Vital Signs    Pre Systolic BP Rehab  111  -LO     Pre Treatment Diastolic BP  69  -LO     O2 Delivery Pre Treatment  room air  -LO     O2  Delivery Intra Treatment  room air  -LO     O2 Delivery Post Treatment  room air  -LO     Pre Patient Position  Supine  -LO     Intra Patient Position  Standing  -LO     Post Patient Position  Sitting  -LO     Row Name 09/05/20 1011          Positioning and Restraints    Pre-Treatment Position  in bed  -LO     Post Treatment Position  bed  -LO     In Bed  notified nsg;sitting;encouraged to call for assist;call light within reach;side rails up x2  -LO       User Key  (r) = Recorded By, (t) = Taken By, (c) = Cosigned By    Initials Name Provider Type    Alanis Sky PT Physical Therapist        Outcome Measures     Row Name 09/05/20 1011          How much help from another person do you currently need...    Turning from your back to your side while in flat bed without using bedrails?  4  -LO     Moving from lying on back to sitting on the side of a flat bed without bedrails?  4  -LO     Moving to and from a bed to a chair (including a wheelchair)?  4  -LO     Standing up from a chair using your arms (e.g., wheelchair, bedside chair)?  4  -LO     Climbing 3-5 steps with a railing?  4  -LO     To walk in hospital room?  4  -LO     AM-PAC 6 Clicks Score (PT)  24  -LO       User Key  (r) = Recorded By, (t) = Taken By, (c) = Cosigned By    Initials Name Provider Type    Alanis Sky, AMAURY Physical Therapist        Physical Therapy Education                 Title: PT OT SLP Therapies (In Progress)     Topic: Physical Therapy (Done)     Point: Mobility training (Done)     Description:   Instruct learner(s) on safety and technique for assisting patient out of bed, chair or wheelchair.  Instruct in the proper use of assistive devices, such as walker, crutches, cane or brace.              Patient Friendly Description:   It's important to get you on your feet again, but we need to do so in a way that is safe for you. Falling has serious consequences, and your personal safety is the most important thing of all.        When  it's time to get out of bed, one of us or a family member will sit next to you on the bed to give you support.     If your doctor or nurse tells you to use a walker, crutches, a cane, or a brace, be sure you use it every time you get out of bed, even if you think you don't need it.    Learning Progress Summary           Patient Eager, E,TB,D,H, NR,VU by SC at 9/4/2020 0835    Comment:  reviewed HEP, back precautions and log rolling                   Point: Home exercise program (Done)     Description:   Instruct learner(s) on appropriate technique for monitoring, assisting and/or progressing patient with therapeutic exercises and activities.              Learning Progress Summary           Patient Eager, E,TB,D,H, NR,VU by SC at 9/4/2020 0835    Comment:  reviewed HEP, back precautions and log rolling                   Point: Body mechanics (Done)     Description:   Instruct learner(s) on proper positioning and spine alignment for patient and/or caregiver during mobility tasks and/or exercises.              Learning Progress Summary           Patient Eager, E,TB,D,H, NR,VU by SC at 9/4/2020 0835    Comment:  reviewed HEP, back precautions and log rolling                   Point: Precautions (Done)     Description:   Instruct learner(s) on prescribed precautions during mobility and gait tasks              Learning Progress Summary           Patient Eager, E,TB,D,H, NR,VU by SC at 9/4/2020 0835    Comment:  reviewed HEP, back precautions and log rolling                               User Key     Initials Effective Dates Name Provider Type Discipline    SC 06/19/15 -  Nicolasa Callahan, PT Physical Therapist PT              PT Recommendation and Plan     Outcome Summary/Treatment Plan (PT)  Anticipated Discharge Disposition (PT): home with assist  Plan of Care Reviewed With: patient  Progress: improving  Outcome Summary: Patient continues to demonstrate improvements in PT this AM. Performs bed mobility with CGA for log  rolling technique, transfers without AD with supervision, & ambulates x 600' independently without loss of balance. HEP and low back precautions reviewed. Continue to recommend home at [place].     Time Calculation:   PT Charges     Row Name 09/05/20 1011             Time Calculation    Start Time  1011  -LO      PT Received On  09/05/20  -LO      PT Goal Re-Cert Due Date  09/14/20  -LO         Timed Charges    19456 - PT Therapeutic Exercise Minutes  5  -LO      70386 - Gait Training Minutes   10  -LO      19345 - PT Therapeutic Activity Minutes  5  -LO        User Key  (r) = Recorded By, (t) = Taken By, (c) = Cosigned By    Initials Name Provider Type    LO Alanis Brush, PT Physical Therapist        Therapy Charges for Today     Code Description Service Date Service Provider Modifiers Qty    [id number] HC GAIT TRAINING EA 15 MIN 9/5/2020 Alanis Brush, PT GP 1          PT G-Codes  Outcome Measure Options: AM-PAC 6 Clicks Daily Activity (OT)  AM-PAC 6 Clicks Score (PT): 24  AM-PAC 6 Clicks Score (OT): 18    Alanis Brush PT  9/5/2020

## 2020-09-08 NOTE — DISCHARGE SUMMARY
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  Preoperative studies demonstrated spondylolisthesis at L4-5 with high-grade stenosis and evidence of instability.  As such, patient presented for lumbar decompression with fusion and stabilization at L4-5 on 9/3/2020.    Hospital Course  Results from last 7 days   Lab Units 09/04/20  0706 09/01/20  1415   WBC 10*3/mm3  --  7.04   HEMOGLOBIN g/dL 13.9 16.6   HEMATOCRIT % 43.1 51.4*   PLATELETS 10*3/mm3  --  249     Results from last 7 days   Lab Units 09/01/20  1415   POTASSIUM mmol/L 5.3*       Over the course of his hospital stay, vitals remained stable and his post-op dressing was clean, dry and intact.  Motor and sensory function were found to be intact throughout.  He was ambulatory, voiding independently, and tolerated PO without associated nausea or vomiting. Pain was minimal and well controlled with oral medications at the time of discharge on 09/04/2020.     Condition on Discharge:  Stable   Discharge to: Home    PATIENT SPECIFIC EDUCATION/PLAN:  1. Follow-up with Neurosurgery in 3 weeks with AP and Lateral XRAYs   2. No driving until follow-up.  3. No lifting greater than 10 lbs   4. The patient may get incision wet in the shower beginning on 9/09/2020.   5. NO tub bathing or swimming until follow-up  6. Ice pack to incision(s) as needed for associated pain or swelling     Discharge Medications     Discharge Medications      Changes to Medications       Instructions Start Date   HYDROcodone-acetaminophen  MG per tablet  Commonly known as:  NORCO  What changed:  when to take this   1 tablet, Oral, 3 Times Daily PRN         Continue These Medications      Instructions Start Date   acyclovir 200 MG capsule  Commonly known as:  ZOVIRAX   200 mg, Oral, 4 Times Daily PRN      amphetamine-dextroamphetamine 20 MG tablet  Commonly known as:  ADDERALL   20 mg, Oral, 3 Times Daily PRN      FUROSEMIDE PO   1 tablet, Oral, Daily PRN      gabapentin 300 MG capsule  Commonly known as:  NEURONTIN  Notes to patient:   TAKE AT BEDTIME FOR 3 DAYS ONLY THEN SEE WEANING DOSE   TAKE ONE CAPSULE BY MOUTH AT BEDTIME FOR 3 DAYS, THEN 1 TWICE DAILY FOR 3 DAYS, THEN 1 CAPSULE THREE TIMES DAILY THEREAFTER      lisinopril 10 MG tablet  Commonly known as:  PRINIVIL,ZESTRIL   10 mg, Oral, Nightly      omeprazole 40 MG capsule  Commonly known as:  priLOSEC   40 mg, Oral, Daily      traZODone 100 MG tablet  Commonly known as:  DESYREL   300 mg, Oral, Nightly             Follow-up Appointments  Future Appointments   Date Time Provider Department Center   9/25/2020 10:00 AM Mercy Aguilar PA-C [facility]     Additional Instructions for the Follow-ups that You Need to Schedule     Discharge Follow-up with Specified Provider: Chidi; 3 Weeks   As directed      To:  Chidi    Follow Up:  3 Weeks    Follow Up Details:  Follow-up with physician's assistant in 3 weeks with AP and lateral lumbar spine x-rays               Referring Provider  Campbell Murillo MD    PCP   Kassandra Manley APRN Sarah C Overmyer, PA-C  09/08/20  09:11

## 2020-09-25 ENCOUNTER — HOSPITAL ENCOUNTER (OUTPATIENT)
Dept: GENERAL RADIOLOGY | Facility: HOSPITAL | Age: 75
Discharge: HOME | End: 2020-09-25
Admitting: NEUROLOGICAL SURGERY

## 2020-09-25 ENCOUNTER — OFFICE VISIT (OUTPATIENT)
Dept: NEUROSURGERY | Facility: CLINIC | Age: 75
End: 2020-09-25

## 2020-09-25 VITALS — BODY MASS INDEX: 26.55 KG/M2 | HEIGHT: 72 IN | WEIGHT: 196 LBS

## 2020-09-25 DIAGNOSIS — M54.16 LUMBAR RADICULOPATHY: ICD-10-CM

## 2020-09-25 DIAGNOSIS — M48.061 SPINAL STENOSIS OF LUMBAR REGION WITHOUT NEUROGENIC CLAUDICATION: ICD-10-CM

## 2020-09-25 DIAGNOSIS — M43.16 SPONDYLOLISTHESIS OF LUMBAR REGION: ICD-10-CM

## 2020-09-25 DIAGNOSIS — Z98.1 S/P LUMBAR FUSION: ICD-10-CM

## 2020-09-25 DIAGNOSIS — M47.816 FACET ARTHRITIS OF LUMBAR REGION: ICD-10-CM

## 2020-09-25 DIAGNOSIS — Z98.1 S/P LUMBAR FUSION: Primary | ICD-10-CM

## 2020-09-25 PROCEDURE — 72100 X-RAY EXAM L-S SPINE 2/3 VWS: CPT

## 2020-09-25 PROCEDURE — 99024 POSTOP FOLLOW-UP VISIT: CPT | Performed by: PHYSICIAN ASSISTANT

## 2020-09-25 NOTE — PROGRESS NOTES
"Patient: Nicholas Post  : 1945  Chart #: [id number]    Date of Service: 2020    CHIEF COMPLAINT: L4-5 spondylolisthesis, stenosis, instability    History of Present Illness Mr. Post is a 75-year-old gentleman who presented with progressive low back pain that predominantly involved his right leg.  He had ongoing pain despite time and nonoperative measures.  His studies demonstrated spondylolisthesis at L4-5 with high-grade stenosis and evidence of instability.  As such on 9/3/2020 he underwent an uncomplicated PLIF at this level.    Today patient is 3 weeks postop.  Today was the first morning he woke up without any soreness or pain in his low back.  Then on the way to the appointment today, he had to remove a tree from the road.  He has had a \"twinge\" of pain in the low back since doing that.  No pain into his legs.  He is some sensory alteration in the lateral toes.  No incisional issues.      Past Medical History:   Diagnosis Date   • Back pain    • GERD (gastroesophageal reflux disease)    • Herpes    • Hypertension    • Osteoarthritis    • Wears reading eyeglasses          Current Outpatient Medications:   •  acyclovir (ZOVIRAX) 200 MG capsule, Take 200 mg by mouth 4 (Four) Times a Day As Needed (herpes flair up)., Disp: , Rfl:   •  amphetamine-dextroamphetamine (ADDERALL) 20 MG tablet, Take 20 mg by mouth 3 (Three) Times a Day As Needed (attention/focus work)., Disp: , Rfl:   •  FUROSEMIDE PO, Take 1 tablet by mouth Daily As Needed (left chest swelling)., Disp: , Rfl:   •  gabapentin (NEURONTIN) 300 MG capsule, TAKE ONE CAPSULE BY MOUTH AT BEDTIME FOR 3 DAYS, THEN 1 TWICE DAILY FOR 3 DAYS, THEN 1 CAPSULE THREE TIMES DAILY THEREAFTER, Disp: 90 capsule, Rfl: 1  •  HYDROcodone-acetaminophen (NORCO)  MG per tablet, Take 1 tablet by mouth 3 (Three) Times a Day As Needed for Moderate Pain ., Disp: 30 tablet, Rfl: 0  •  lisinopril (PRINIVIL,ZESTRIL) 10 MG tablet, Take 10 mg by mouth " Every Night., Disp: , Rfl:   •  omeprazole (priLOSEC) 40 MG capsule, Take 40 mg by mouth Daily., Disp: , Rfl:   •  traZODone (DESYREL) 100 MG tablet, Take 300 mg by mouth Every Night., Disp: , Rfl: 1    Past Surgical History:   Procedure Laterality Date   • BACK SURGERY  1990's    Lumbar discectomy- [facility]    • COLONOSCOPY     • HAND SURGERY Left     pin   • HERNIA REPAIR     • KNEE ARTHROSCOPY Left    • LUMBAR LAMINECTOMY WITH FUSION N/A 9/3/2020    Procedure: LUMBAR FUSION DECOMPRESSON WITH PEDICLE SCREWS L4-5;  Surgeon: Campbell Murillo MD;  Location: Replaced by [facility];  Service: Neurosurgery;  Laterality: N/A;   • ROTATOR CUFF REPAIR Bilateral    • WISDOM TOOTH EXTRACTION         Social History     Socioeconomic History   • Marital status:      Spouse name: Not on file   • Number of children: Not on file   • Years of education: Not on file   • Highest education level: Not on file   Tobacco Use   • Smoking status: Never Smoker   • Smokeless tobacco: Never Used   Substance and Sexual Activity   • Alcohol use: Yes     Comment: 7 drinks weekly    • Drug use: Yes     Types: Marijuana     Comment: occasional, last use 9-1-20   • Sexual activity: Defer         Review of Systems   All other systems reviewed and are negative.      Objective   Vital Signs: There were no vitals taken for this visit.  Physical Exam  Vitals signs and nursing note reviewed.   Constitutional:       General: He is not in acute distress.     Appearance: He is well-developed.   Skin:     Comments: Lumbar incision has healed up nicely.  No evidence of drainage or dehiscence   Psychiatric:         Behavior: Behavior normal.         Thought Content: Thought content normal.     Musculoskeletal:     Station and gait are normal.  Neurologic:     Muscle tone is normal throughout.     Coordination is intact.     Patient is oriented to person, place, and time.         Independent review of radiographic imaging: Plain films of the lumbar spine were reviewed  and demonstrate excellent positioning and alignment of surgical construct at L4-5    Assessment/Plan   Diagnosis: L4-5 spondylolisthesis, stenosis, and instability status post PLIF    Medical Decision Making: Mr. Post is doing excellent. He only has pain when he has overdone.  No symptoms into his legs. He is returning to [place] in a couple weeks and is not sure exactly when he is returning. I went over activity limitations and restrictions as he slowly gets back into things.  I would like him to follow-up with Dr. Murillo in about 8 weeks to check his progress-we will try to coordinate an appointment in between his trips to [place].  He may call us at any time should he have any questions or concerns.        Diagnoses and all orders for this visit:    S/P lumbar fusion    Lumbar radiculopathy    Spondylolisthesis of lumbar region    Facet arthritis of lumbar region    Spinal stenosis of lumbar region without neurogenic claudication                          Mercy Aguilar PA-C  Patient Care Team:  Kassandra Manley APRN as PCP - General (Family Medicine)

## 2021-02-10 DIAGNOSIS — M54.16 LUMBAR RADICULOPATHY: ICD-10-CM

## 2021-02-10 RX ORDER — GABAPENTIN 300 MG/1
CAPSULE ORAL
Qty: 90 CAPSULE | Refills: 0 | Status: SHIPPED | OUTPATIENT
Start: 2021-02-10 | End: 2021-05-06

## 2021-02-10 NOTE — TELEPHONE ENCOUNTER
Provider:  Chidi  Caller:  Automated refill request  Surgery:  L4/5 Fusion  Surgery Date:  09/03/20  Last visit:  09/25/20  Next visit: NA    Reason for call:         Requested Prescriptions     Pending Prescriptions Disp Refills   • gabapentin (NEURONTIN) 300 MG capsule [Pharmacy Med Name: GABAPENTIN 300MG CAPSULES] 90 capsule      Sig: TAKE ONE CAPSULE BY MOUTH THREE TIMES DAILY     Miguel:     08/27/2020 Amphetamine/Dextroampheta  5MG/5MG/5MG/5MG  1945 90 30 YAMILETH [place] [employer] [facility] 1  09/02/2020 Gabapentin 300MG 1945 90 30 CHIDI WAGGONER [employer] [facility] 1  09/05/2020 Hydrocodone/Acetaminophen  325MG/10MG  1945 30 10 RUBI RUSHING [employer] [facility] 30 1  09/25/2020 Gabapentin 300MG 1945 90 30 CHIDI WAGGONER [employer] [facility] 1  10/26/2020 Amphetamine/Dextroampheta  5MG/5MG/5MG/5MG  1945 90 30 YAMILETH IV Novav[employer] [facility] 1

## 2021-05-05 DIAGNOSIS — M54.16 LUMBAR RADICULOPATHY: ICD-10-CM

## 2021-05-05 NOTE — TELEPHONE ENCOUNTER
Provider:  Chidi  Caller:  Automated refill request  Surgery:  L4/5 Fusion  Surgery Date:  09/03/20  Last visit:  09/25/20  Next visit: NA    Reason for call:         Requested Prescriptions     Pending Prescriptions Disp Refills   • gabapentin (NEURONTIN) 300 MG capsule [Pharmacy Med Name: GABAPENTIN 300MG CAPSULES] 90 capsule      Sig: TAKE 1 CAPSULE BY MOUTH THREE TIMES DAILY     Miguel:     09/25/2020 Gabapentin 300MG 1945 90 30 [facility]I [facility]  10/26/2020 Amphetamine/Dextroampheta  5MG/5MG/5MG/5MG  1945 90 30 YAMILETH [place] [facility] 1  02/10/2021 Gabapentin 300MG 1945 90 30 CHIDI WAGGONER [place] [facility] 1  03/16/2021 Amphetamine/Dextroampheta  5MG/5MG/5MG/5MG  1945 90 30 TESS EVANS [place] [facility] 1

## 2021-05-06 RX ORDER — GABAPENTIN 300 MG/1
CAPSULE ORAL
Qty: 90 CAPSULE | Refills: 1 | Status: SHIPPED | OUTPATIENT
Start: 2021-05-06

## 2023-01-01 ENCOUNTER — HOSPITAL ENCOUNTER (EMERGENCY)
Facility: HOSPITAL | Age: 78
Discharge: HOME | End: 2023-01-01
Attending: EMERGENCY MEDICINE | Admitting: EMERGENCY MEDICINE
Payer: MEDICARE

## 2023-01-01 VITALS
BODY MASS INDEX: 24.65 KG/M2 | WEIGHT: 182 LBS | HEIGHT: 72 IN | SYSTOLIC BLOOD PRESSURE: 115 MMHG | RESPIRATION RATE: 16 BRPM | HEART RATE: 93 BPM | OXYGEN SATURATION: 96 % | DIASTOLIC BLOOD PRESSURE: 105 MMHG | TEMPERATURE: 98.1 F

## 2023-01-01 DIAGNOSIS — J10.1 INFLUENZA A: Primary | ICD-10-CM

## 2023-01-01 LAB
FLUAV SUBTYP SPEC NAA+PROBE: DETECTED
FLUBV RNA ISLT QL NAA+PROBE: NOT DETECTED
SARS-COV-2 RNA PNL SPEC NAA+PROBE: NOT DETECTED

## 2023-01-01 PROCEDURE — 87636 SARSCOV2 & INF A&B AMP PRB: CPT

## 2023-01-01 PROCEDURE — C9803 HOPD COVID-19 SPEC COLLECT: HCPCS

## 2023-01-01 PROCEDURE — 99283 EMERGENCY DEPT VISIT LOW MDM: CPT

## 2023-01-02 NOTE — DISCHARGE INSTRUCTIONS
Rest.  Tylenol or motrin as directed for fever or pain.  You can use an over the counter cough med such as Delsym or Mucinex as directed.  Follow up with your PCP if symptoms persist or return to the ER if worse.

## 2023-01-02 NOTE — ED PROVIDER NOTES
Subjective   History of Present Illness  Mr. Post is a 77-year-old male who presents to the emergency department with a 1 week history of mild cough and body aches.  He denies any chest pain.  He has had some subjective fevers initially but they have resolved.  No associated shortness of breath.  No nausea, vomiting or diarrhea.  No known exposure to COVID or influenza.  No known health issues.        Review of Systems   Constitutional: Positive for appetite change and fever (Initially but now resolved).   HENT: Negative for sore throat.    Respiratory: Positive for cough. Negative for shortness of breath.    Cardiovascular: Negative for chest pain and palpitations.   Gastrointestinal: Negative for abdominal pain, diarrhea, nausea and vomiting.   Genitourinary: Negative for dysuria.   Musculoskeletal: Positive for myalgias.   Skin: Negative for rash.   Allergic/Immunologic: Negative for immunocompromised state.   Neurological: Negative for weakness and headaches.   Hematological: Negative.    Psychiatric/Behavioral: Negative.        Past Medical History:   Diagnosis Date   • Back pain    • GERD (gastroesophageal reflux disease)    • Herpes    • Hypertension    • Osteoarthritis    • Wears reading eyeglasses        No Known Allergies    Past Surgical History:   Procedure Laterality Date   • BACK SURGERY  1990's    Lumbar discectomy-     • COLONOSCOPY     • HAND SURGERY Left     pin   • HERNIA REPAIR     • KNEE ARTHROSCOPY Left    • LUMBAR LAMINECTOMY WITH FUSION N/A 9/3/2020    Procedure: LUMBAR FUSION DECOMPRESSON WITH PEDICLE SCREWS L4-5;  Surgeon: Campbell Murillo MD;  Location: [facility];  Service: Neurosurgery;  Laterality: N/A;   • ROTATOR CUFF REPAIR Bilateral    • WISDOM TOOTH EXTRACTION         Family History   Problem Relation Age of Onset   • Cancer Mother    • Stroke Mother    • Heart attack Father        Social History     Socioeconomic History   • Marital status: Significant Other   Tobacco Use   •  Smoking status: Never   • Smokeless tobacco: Never   Substance and Sexual Activity   • Alcohol use: Yes     Comment: 7 drinks weekly    • Drug use: Yes     Types: Marijuana     Comment: occasional, last use 9-1-20   • Sexual activity: Defer           Objective   Physical Exam  Constitutional:       General: He is not in acute distress.     Appearance: Normal appearance.   HENT:      Head: Normocephalic.      Nose: Nose normal.      Mouth/Throat:      Mouth: Mucous membranes are moist.      Pharynx: Oropharynx is clear. No oropharyngeal exudate.   Eyes:      General: No scleral icterus.     Conjunctiva/sclera: Conjunctivae normal.      Pupils: Pupils are equal, round, and reactive to light.   Cardiovascular:      Rate and Rhythm: Normal rate and regular rhythm.      Pulses: Normal pulses.      Heart sounds: Normal heart sounds.   Pulmonary:      Effort: Pulmonary effort is normal.      Breath sounds: Normal breath sounds. No wheezing, rhonchi or rales.   Abdominal:      General: Bowel sounds are normal.      Tenderness: There is no abdominal tenderness.   Musculoskeletal:         General: Normal range of motion.      Cervical back: Normal range of motion and neck supple.      Right lower leg: No edema.      Left lower leg: No edema.   Skin:     General: Skin is warm and dry.      Findings: No rash.   Neurological:      General: No focal deficit present.      Mental Status: He is alert and oriented to person, place, and time.   Psychiatric:         Mood and Affect: Mood normal.         Procedures           ED Course      77-year-old male with a 1 week history of flulike symptoms including cough, body aches and subjective fevers.  The patient states that he is beginning to feel some better in the past day or 2.  No chest pain or shortness of breath.  No abdominal pain, nausea, vomiting or diarrhea.    Patient appears in no distress.  COVID swab is positive for influenza A.    Patient is outside the window for Tamiflu  to be of any use.  He will be discharged home to take over-the-counter Tylenol or Motrin and over-the-counter cough meds such as Mucinex or Delsym.  He has been advised to follow-up with his PCP or return to the emergency department if worse.                                         MDM    Final diagnoses:   Influenza A       ED Disposition  ED Disposition     ED Disposition   Discharge    Condition   Stable    Comment   --             Kassandra Manley, APRN  [address] [facility] [address] [phone]      As needed    [facility] Emergency Department  [address] [place] [address] [phone]    If symptoms worsen         Medication List      No changes were made to your prescriptions during this visit.          Raul Monge, PA  01/01/23 1932

## 2023-04-24 ENCOUNTER — OFFICE VISIT (OUTPATIENT)
Dept: INTERNAL MEDICINE | Facility: CLINIC | Age: 78
End: 2023-04-24
Payer: MEDICARE

## 2023-04-24 VITALS
DIASTOLIC BLOOD PRESSURE: 62 MMHG | TEMPERATURE: 97.7 F | BODY MASS INDEX: 25.48 KG/M2 | HEART RATE: 73 BPM | OXYGEN SATURATION: 96 % | SYSTOLIC BLOOD PRESSURE: 120 MMHG | WEIGHT: 182 LBS | HEIGHT: 71 IN

## 2023-04-24 DIAGNOSIS — F90.9 ATTENTION DEFICIT HYPERACTIVITY DISORDER (ADHD), UNSPECIFIED ADHD TYPE: ICD-10-CM

## 2023-04-24 DIAGNOSIS — M19.90 ARTHRITIS: ICD-10-CM

## 2023-04-24 DIAGNOSIS — Z00.00 WELL ADULT EXAM: Primary | ICD-10-CM

## 2023-04-24 DIAGNOSIS — N52.9 ERECTILE DYSFUNCTION, UNSPECIFIED ERECTILE DYSFUNCTION TYPE: ICD-10-CM

## 2023-04-24 DIAGNOSIS — I10 HYPERTENSION, UNSPECIFIED TYPE: ICD-10-CM

## 2023-04-24 RX ORDER — LISINOPRIL AND HYDROCHLOROTHIAZIDE 12.5; 1 MG/1; MG/1
1 TABLET ORAL DAILY
COMMUNITY
Start: 2023-02-01 | End: 2023-04-24 | Stop reason: SDUPTHER

## 2023-04-24 RX ORDER — LISINOPRIL AND HYDROCHLOROTHIAZIDE 12.5; 1 MG/1; MG/1
1 TABLET ORAL DAILY
Qty: 90 TABLET | Refills: 3 | Status: SHIPPED | OUTPATIENT
Start: 2023-04-24

## 2023-04-24 RX ORDER — OMEPRAZOLE 40 MG/1
40 CAPSULE, DELAYED RELEASE ORAL DAILY
Qty: 90 CAPSULE | Refills: 1 | Status: SHIPPED | OUTPATIENT
Start: 2023-04-24

## 2023-04-24 RX ORDER — SILDENAFIL 100 MG/1
100 TABLET, FILM COATED ORAL DAILY PRN
COMMUNITY

## 2023-04-24 NOTE — PROGRESS NOTES
Office Note     Name: Nicholas Post    : 1945     MRN: [id number]     Chief Complaint  Hypertension and Establish Care    Subjective     History of Present Illness:  Nicholas Post is a 78 y.o. male who presents today for     HTN  Controlled in office 120/62  Currently taking Prinzide 10-12.5mg  No headaches, no chest pain, no shortness of breath, no dizziness, no syncope.    ADHD  Was seen at [facility].  He is on Adderall 20mg three times daily for ADHD.    He has been on regimen since   Self employed, was having difficulties with concentration with work.  Affected his personal life, would have difficulties with multiple projections. Would never complete.  Since being on this regiment, his work has improved. His most significant change has been in personal life, feels like his life is more focused.    Erectile Dysfunction  Takeing viagra 100mg prn      Hx of osteoarthritis  Uses OTC NSAID/tylenol as needed    Insomnia  Chronic issue,s  Stable, taking trazodone for sleep which helps him fall asleep.        Review of Systems:   Review of Systems   All other systems reviewed and are negative.      Past Medical History:   Past Medical History:   Diagnosis Date   • Back pain    • GERD (gastroesophageal reflux disease)    • Herpes    • Hypertension    • Osteoarthritis    • Wears reading eyeglasses        Past Surgical History:   Past Surgical History:   Procedure Laterality Date   • BACK SURGERY      Lumbar discectomy-     • COLONOSCOPY     • HAND SURGERY Left     pin   • HERNIA REPAIR     • KNEE ARTHROSCOPY Left    • LUMBAR LAMINECTOMY WITH FUSION N/A 9/3/2020    Procedure: LUMBAR FUSION DECOMPRESSON WITH PEDICLE SCREWS L4-5;  Surgeon: Campbell Murillo MD;  Location: [facility];  Service: Neurosurgery;  Laterality: N/A;   • ROTATOR CUFF REPAIR Bilateral    • WISDOM TOOTH EXTRACTION         Family History:   Family History   Problem Relation Age of Onset  "  • Cancer Mother    • Stroke Mother    • Heart attack Father        Social History:   Social History     Socioeconomic History   • Marital status: Significant Other   Tobacco Use   • Smoking status: Never   • Smokeless tobacco: Never   Vaping Use   • Vaping Use: Never used   Substance and Sexual Activity   • Alcohol use: Yes     Comment: 7 drinks weekly    • Drug use: Yes     Types: Marijuana     Comment: occasional, last use 9-1-20   • Sexual activity: Defer       Immunizations:   Immunization History   Administered Date(s) Administered   • COVID-19 (PFIZER) PURPLE CAP 11/01/2021        Medications:     Current Outpatient Medications:   •  acyclovir (ZOVIRAX) 200 MG capsule, Take 1 capsule by mouth 4 (Four) Times a Day As Needed (herpes flair up)., Disp: , Rfl:   •  amphetamine-dextroamphetamine (ADDERALL) 20 MG tablet, Take 1 tablet by mouth 3 (Three) Times a Day As Needed (attention/focus work)., Disp: , Rfl:   •  lisinopril-hydrochlorothiazide (PRINZIDE,ZESTORETIC) 10-12.5 MG per tablet, Take 1 tablet by mouth Daily., Disp: 90 tablet, Rfl: 3  •  omeprazole (priLOSEC) 40 MG capsule, Take 1 capsule by mouth Daily., Disp: 90 capsule, Rfl: 1  •  sildenafil (Viagra) 100 MG tablet, Take 1 tablet by mouth Daily As Needed for Erectile Dysfunction., Disp: , Rfl:   •  traZODone (DESYREL) 100 MG tablet, Take 3 tablets by mouth Every Night., Disp: , Rfl: 1  •  Diclofenac Sodium (VOLTAREN) 1 % gel gel, Apply 4 g topically to the appropriate area as directed 2 (Two) Times a Day., Disp: 350 g, Rfl: 0    Allergies:   Allergies   Allergen Reactions   • Poison Ivy Extract Rash       Objective     Vital Signs  /62   Pulse 73   Temp 97.7 °F (36.5 °C) (Temporal)   Ht 179.1 cm (70.5\")   Wt 82.6 kg (182 lb)   SpO2 96%   BMI 25.75 kg/m²   Estimated body mass index is 25.75 kg/m² as calculated from the following:    Height as of this encounter: 179.1 cm (70.5\").    Weight as of this encounter: 82.6 kg (182 " lb).          Physical Exam  Constitutional:       Appearance: Normal appearance.   Cardiovascular:      Rate and Rhythm: Normal rate and regular rhythm.      Pulses: Normal pulses.      Heart sounds: Normal heart sounds.   Pulmonary:      Effort: Pulmonary effort is normal.      Breath sounds: Normal breath sounds.   Abdominal:      General: Abdomen is flat.      Palpations: Abdomen is soft.   Neurological:      Mental Status: He is alert.          Result Review :                  Assessment and Plan     1. Well adult exam  The following were discussed at today's wellness visit today: preventaitve: Nutrition, Physical activity, Healthy weight, Injury prevention, Dental health, Immunizations and Screenings    - CBC Auto Differential  - Comprehensive Metabolic Panel  - Lipid Panel; Future  - TSH; Future  - T4, free; Future  - Hepatitis C antibody; Future    2. Arthritis    - Diclofenac Sodium (VOLTAREN) 1 % gel gel; Apply 4 g topically to the appropriate area as directed 2 (Two) Times a Day.  Dispense: 350 g; Refill: 0    3. Attention deficit hyperactivity disorder (ADHD), unspecified ADHD type  See behavioral health at [facility]  Will follow up on their health records  Currently on adderall 20mg TID, stable    4. Erectile dysfunction, unspecified erectile dysfunction type    - sildenafil (Viagra) 100 MG tablet; Take 1 tablet by mouth Daily As Needed for Erectile Dysfunction.    5. Hypertension, unspecified type  Controlled  Low salt diet, weight control and physical activity.  - lisinopril-hydrochlorothiazide (PRINZIDE,ZESTORETIC) 10-12.5 MG per tablet; Take 1 tablet by mouth Daily.  Dispense: 90 tablet; Refill: 3       Follow Up  Return in about 3 months (around 7/24/2023) for Recheck.    Campbell Jackson MD  [facility]  [address] [address] [phone]

## 2023-04-27 ENCOUNTER — PATIENT ROUNDING (BHMG ONLY) (OUTPATIENT)
Dept: INTERNAL MEDICINE | Facility: CLINIC | Age: 78
End: 2023-04-27
Payer: MEDICARE

## 2023-04-27 NOTE — PROGRESS NOTES
A [employer] message has been sent to the patient for patient rounding with [facility].

## 2023-05-04 ENCOUNTER — LAB (OUTPATIENT)
Dept: INTERNAL MEDICINE | Facility: CLINIC | Age: 78
End: 2023-05-04
Payer: MEDICARE

## 2023-05-04 DIAGNOSIS — Z00.00 WELL ADULT EXAM: Primary | ICD-10-CM

## 2023-05-04 PROCEDURE — 36415 COLL VENOUS BLD VENIPUNCTURE: CPT | Performed by: STUDENT IN AN ORGANIZED HEALTH CARE EDUCATION/TRAINING PROGRAM

## 2023-05-05 LAB
ALBUMIN SERPL-MCNC: 4.3 G/DL (ref 3.5–5.2)
ALBUMIN/GLOB SERPL: 1.7 G/DL
ALP SERPL-CCNC: 68 U/L (ref 39–117)
ALT SERPL-CCNC: 19 U/L (ref 1–41)
AST SERPL-CCNC: 19 U/L (ref 1–40)
BASOPHILS # BLD AUTO: 0.05 10*3/MM3 (ref 0–0.2)
BASOPHILS NFR BLD AUTO: 0.6 % (ref 0–1.5)
BILIRUB SERPL-MCNC: 0.2 MG/DL (ref 0–1.2)
BUN SERPL-MCNC: 20 MG/DL (ref 8–23)
BUN/CREAT SERPL: 18 (ref 7–25)
CALCIUM SERPL-MCNC: 9.6 MG/DL (ref 8.6–10.5)
CHLORIDE SERPL-SCNC: 103 MMOL/L (ref 98–107)
CHOLEST SERPL-MCNC: 201 MG/DL (ref 0–200)
CO2 SERPL-SCNC: 25.6 MMOL/L (ref 22–29)
CREAT SERPL-MCNC: 1.11 MG/DL (ref 0.76–1.27)
EGFRCR SERPLBLD CKD-EPI 2021: 68.8 ML/MIN/1.73
EOSINOPHIL # BLD AUTO: 0.39 10*3/MM3 (ref 0–0.4)
EOSINOPHIL NFR BLD AUTO: 4.4 % (ref 0.3–6.2)
ERYTHROCYTE [DISTWIDTH] IN BLOOD BY AUTOMATED COUNT: 13 % (ref 12.3–15.4)
GLOBULIN SER CALC-MCNC: 2.5 GM/DL
GLUCOSE SERPL-MCNC: 97 MG/DL (ref 65–99)
HCT VFR BLD AUTO: 47.2 % (ref 37.5–51)
HCV IGG SERPL QL IA: NON REACTIVE
HDLC SERPL-MCNC: 52 MG/DL (ref 40–60)
HGB BLD-MCNC: 16 G/DL (ref 13–17.7)
IMM GRANULOCYTES # BLD AUTO: 0.03 10*3/MM3 (ref 0–0.05)
IMM GRANULOCYTES NFR BLD AUTO: 0.3 % (ref 0–0.5)
LDLC SERPL CALC-MCNC: 128 MG/DL (ref 0–100)
LYMPHOCYTES # BLD AUTO: 1.54 10*3/MM3 (ref 0.7–3.1)
LYMPHOCYTES NFR BLD AUTO: 17.4 % (ref 19.6–45.3)
MCH RBC QN AUTO: 29.6 PG (ref 26.6–33)
MCHC RBC AUTO-ENTMCNC: 33.9 G/DL (ref 31.5–35.7)
MCV RBC AUTO: 87.4 FL (ref 79–97)
MONOCYTES # BLD AUTO: 0.79 10*3/MM3 (ref 0.1–0.9)
MONOCYTES NFR BLD AUTO: 8.9 % (ref 5–12)
NEUTROPHILS # BLD AUTO: 6.03 10*3/MM3 (ref 1.7–7)
NEUTROPHILS NFR BLD AUTO: 68.4 % (ref 42.7–76)
NRBC BLD AUTO-RTO: 0 /100 WBC (ref 0–0.2)
PLATELET # BLD AUTO: 224 10*3/MM3 (ref 140–450)
POTASSIUM SERPL-SCNC: 4.8 MMOL/L (ref 3.5–5.2)
PROT SERPL-MCNC: 6.8 G/DL (ref 6–8.5)
RBC # BLD AUTO: 5.4 10*6/MM3 (ref 4.14–5.8)
SODIUM SERPL-SCNC: 138 MMOL/L (ref 136–145)
T4 FREE SERPL-MCNC: 1.12 NG/DL (ref 0.93–1.7)
TESTOST SERPL-MCNC: 409 NG/DL (ref 264–916)
TRIGL SERPL-MCNC: 119 MG/DL (ref 0–150)
TSH SERPL DL<=0.005 MIU/L-ACNC: 0.77 UIU/ML (ref 0.27–4.2)
VLDLC SERPL CALC-MCNC: 21 MG/DL (ref 5–40)
WBC # BLD AUTO: 8.83 10*3/MM3 (ref 3.4–10.8)

## 2023-05-18 ENCOUNTER — TELEPHONE (OUTPATIENT)
Dept: INTERNAL MEDICINE | Facility: CLINIC | Age: 78
End: 2023-05-18

## 2023-05-18 NOTE — TELEPHONE ENCOUNTER
Caller: Nicholas Post    Relationship: Self    Best call back number: [phone]    Caller requesting test results: SELF    What test was performed: LABS    When was the test performed: 05/04    Where was the test performed: IN OFFICE    Additional notes: PLEASE CALL AND ADVISE

## 2023-08-30 ENCOUNTER — OFFICE VISIT (OUTPATIENT)
Dept: INTERNAL MEDICINE | Facility: CLINIC | Age: 78
End: 2023-08-30
Payer: MEDICARE

## 2023-08-30 VITALS
TEMPERATURE: 96.9 F | BODY MASS INDEX: 26.46 KG/M2 | OXYGEN SATURATION: 97 % | WEIGHT: 189 LBS | SYSTOLIC BLOOD PRESSURE: 118 MMHG | HEIGHT: 71 IN | HEART RATE: 61 BPM | DIASTOLIC BLOOD PRESSURE: 68 MMHG

## 2023-08-30 DIAGNOSIS — I10 HYPERTENSION, UNSPECIFIED TYPE: Primary | ICD-10-CM

## 2023-08-30 DIAGNOSIS — F90.9 ATTENTION DEFICIT HYPERACTIVITY DISORDER (ADHD), UNSPECIFIED ADHD TYPE: ICD-10-CM

## 2023-08-30 PROCEDURE — 99214 OFFICE O/P EST MOD 30 MIN: CPT | Performed by: STUDENT IN AN ORGANIZED HEALTH CARE EDUCATION/TRAINING PROGRAM

## 2023-08-30 PROCEDURE — 1160F RVW MEDS BY RX/DR IN RCRD: CPT | Performed by: STUDENT IN AN ORGANIZED HEALTH CARE EDUCATION/TRAINING PROGRAM

## 2023-08-30 PROCEDURE — 1159F MED LIST DOCD IN RCRD: CPT | Performed by: STUDENT IN AN ORGANIZED HEALTH CARE EDUCATION/TRAINING PROGRAM

## 2023-08-30 RX ORDER — DEXTROAMPHETAMINE SACCHARATE, AMPHETAMINE ASPARTATE, DEXTROAMPHETAMINE SULFATE AND AMPHETAMINE SULFATE 5; 5; 5; 5 MG/1; MG/1; MG/1; MG/1
20 TABLET ORAL 3 TIMES DAILY PRN
Qty: 270 TABLET | Refills: 0 | Status: SHIPPED | OUTPATIENT
Start: 2023-08-30 | End: 2023-11-28

## 2023-08-30 NOTE — PROGRESS NOTES
Office Note     Name: Nicholas Post    : 1945     MRN: [id number]     Chief Complaint  Med Management and ADHD (Needs Refills need to go to [facility] in [place] due to in stock )    Subjective     History of Present Illness:  Nicholas Post is a 78 y.o. male who presents today for        HTN  Controlled in office 120/62  Currently taking Prinzide 10-12.5mg  No headaches, no chest pain, no shortness of breath, no dizziness, no syncope.     ADHD  Was seen at [facility].  He is on Adderall 20mg three times daily for ADHD.    He has been on regimen since   Self employed, was having difficulties with concentration with work.  Affected his personal life, would have difficulties with multiple projections. Would never complete.  Since being on this regiment, his work has improved. His most significant change has been in personal life, feels like his life is more focused.     Erectile Dysfunction  Takeing viagra 100mg prn       Hx of osteoarthritis  Uses OTC NSAID/tylenol as needed     Insomnia  Chronic issue,s  Stable, taking trazodone for sleep which helps him fall asleep.    Review of Systems:   Review of Systems   All other systems reviewed and are negative.    Past Medical History:   Past Medical History:   Diagnosis Date    Back pain     GERD (gastroesophageal reflux disease)     Herpes     Hypertension     Osteoarthritis     Wears reading eyeglasses        Past Surgical History:   Past Surgical History:   Procedure Laterality Date    BACK SURGERY      Lumbar discectomy-      COLONOSCOPY      HAND SURGERY Left     pin    HERNIA REPAIR      KNEE ARTHROSCOPY Left     LUMBAR LAMINECTOMY WITH FUSION N/A 9/3/2020    Procedure: LUMBAR FUSION DECOMPRESSON WITH PEDICLE SCREWS L4-5;  Surgeon: Campbell Murillo MD;  Location: [facility];  Service: Neurosurgery;  Laterality: N/A;    ROTATOR CUFF REPAIR Bilateral     WISDOM TOOTH EXTRACTION         Family  "History:   Family History   Problem Relation Age of Onset    Cancer Mother     Stroke Mother     Heart attack Father        Social History:   Social History     Socioeconomic History    Marital status: Significant Other   Tobacco Use    Smoking status: Never    Smokeless tobacco: Never   Vaping Use    Vaping Use: Never used   Substance and Sexual Activity    Alcohol use: Yes     Comment: 7 drinks weekly     Drug use: Yes     Types: Marijuana     Comment: occasional, last use 9-1-20    Sexual activity: Defer       Immunizations:   Immunization History   Administered Date(s) Administered    COVID-19 (PFIZER) Purple Cap Monovalent 11/01/2021        Medications:     Current Outpatient Medications:     acyclovir (ZOVIRAX) 200 MG capsule, Take 1 capsule by mouth As Needed (herpes flair up)., Disp: , Rfl:     amphetamine-dextroamphetamine (ADDERALL) 20 MG tablet, Take 1 tablet by mouth 3 (Three) Times a Day As Needed (attention/focus work) for up to 90 days., Disp: 270 tablet, Rfl: 0    lisinopril-hydrochlorothiazide (PRINZIDE,ZESTORETIC) 10-12.5 MG per tablet, Take 1 tablet by mouth Daily., Disp: 90 tablet, Rfl: 3    omeprazole (priLOSEC) 40 MG capsule, Take 1 capsule by mouth Daily., Disp: 90 capsule, Rfl: 1    sildenafil (VIAGRA) 100 MG tablet, Take 1 tablet by mouth Daily As Needed for Erectile Dysfunction., Disp: , Rfl:     traZODone (DESYREL) 100 MG tablet, Take 3 tablets by mouth Every Night., Disp: , Rfl: 1    Allergies:   Allergies   Allergen Reactions    Poison Ivy Extract Rash       Objective     Vital Signs  /68   Pulse 61   Temp 96.9 °F (36.1 °C) (Temporal)   Ht 179.1 cm (70.51\")   Wt 85.7 kg (189 lb)   SpO2 97%   BMI 26.73 kg/m²   Estimated body mass index is 26.73 kg/m² as calculated from the following:    Height as of this encounter: 179.1 cm (70.51\").    Weight as of this encounter: 85.7 kg (189 lb).          Physical Exam  Constitutional:       Appearance: Normal appearance.   Cardiovascular: "      Rate and Rhythm: Normal rate and regular rhythm.      Heart sounds: Normal heart sounds.   Pulmonary:      Effort: Pulmonary effort is normal.      Breath sounds: Normal breath sounds.   Neurological:      Mental Status: He is alert.        Result Review :                  Assessment and Plan     1. Hypertension, unspecified type  Controlled  Continue with Prinzide 10-12.5mg daily    2. Attention deficit hyperactivity disorder (ADHD), unspecified ADHD type  Miguel reviewed  - amphetamine-dextroamphetamine (ADDERALL) 20 MG tablet; Take 1 tablet by mouth 3 (Three) Times a Day As Needed (attention/focus work) for up to 90 days.  Dispense: 270 tablet; Refill: 0       Follow Up  No follow-ups on file.    Campbell Jackson MD  [facility] [place]  [facility]  [address]  [address] [address] [phone]

## 2023-09-05 RX ORDER — OMEPRAZOLE 40 MG/1
40 CAPSULE, DELAYED RELEASE ORAL DAILY
Qty: 90 CAPSULE | Refills: 1 | OUTPATIENT
Start: 2023-09-05

## 2023-10-16 RX ORDER — TRAZODONE HYDROCHLORIDE 100 MG/1
300 TABLET ORAL
Qty: 90 TABLET | OUTPATIENT
Start: 2023-10-16

## 2023-10-16 RX ORDER — TRAZODONE HYDROCHLORIDE 300 MG/1
300 TABLET ORAL NIGHTLY
Qty: 90 TABLET | Refills: 0 | Status: SHIPPED | OUTPATIENT
Start: 2023-10-16

## 2023-10-16 RX ORDER — TRAZODONE HYDROCHLORIDE 300 MG/1
300 TABLET ORAL NIGHTLY
Qty: 90 TABLET | Refills: 0 | OUTPATIENT
Start: 2023-10-16

## 2023-10-16 NOTE — TELEPHONE ENCOUNTER
Caller: Nicholas Post    Relationship: Self    Best call back number: [phone]    Requested Prescriptions:   Requested Prescriptions     Pending Prescriptions Disp Refills    traZODone (DESYREL) 100 MG tablet  1     Sig: Take 3 tablets by mouth Every Night.        Pharmacy where request should be sent: [facility] #[id number] [place] [address] [address] AT [facility] [address] - [phone]  - [phone] FX     Last office visit with prescribing clinician: 8/30/2023   Last telemedicine visit with prescribing clinician: Visit date not found   Next office visit with prescribing clinician: 11/30/2023     Additional details provided by patient: PATIENT LOST HIS BOTTLE    Does the patient have less than a 3 day supply:  [x] Yes  [] No    Would you like a call back once the refill request has been completed: [] Yes [x] No    If the office needs to give you a call back, can they leave a voicemail: [] Yes [x] No    Marialuisa Manley Rep   10/16/23 15:01 EDT

## 2023-10-16 NOTE — TELEPHONE ENCOUNTER
Refill request trazodone   Last refill 11/27/2017 unknown provider   Last visit 8/30/23  Next visit 11/30/23

## 2023-10-16 NOTE — TELEPHONE ENCOUNTER
Caller: Nicholas Post    Relationship: Self    Best call back number: [phone]    Requested Prescriptions:   Requested Prescriptions     Pending Prescriptions Disp Refills    traZODone (DESYREL) 100 MG tablet  1     Sig: Take 3 tablets by mouth Every Night.        Pharmacy where request should be sent: [facility] #[id number] [place] [address] [address] [facility] [address] - [phone]  - [phone] FX     Last office visit with prescribing clinician: 8/30/2023   Last telemedicine visit with prescribing clinician: Visit date not found   Next office visit with prescribing clinician: 11/30/2023     Additional details provided by patient: PATIENT LOST HIS BOTTLE    Does the patient have less than a 3 day supply:  [x] Yes  [] No    Would you like a call back once the refill request has been completed: [] Yes [x] No    If the office needs to give you a call back, can they leave a voicemail: [] Yes [x] No    Marialuisa Blevins Rep   10/16/23 09:17 EDT

## 2023-10-16 NOTE — TELEPHONE ENCOUNTER
Caller: Nicholas Post     Relationship: Self     Best call back number: [phone]     Requested Prescriptions:   Requested Prescriptions             Pending Prescriptions Disp Refills    traZODone (DESYREL) 100 MG tablet   1       Sig: Take 3 tablets by mouth Every Night.         Pharmacy where request should be sent: [facility] #[id number] [place] [address] [address] AT [facility] [address] - [phone]  - [phone] FX      Last office visit with prescribing clinician: 8/30/2023   Last telemedicine visit with prescribing clinician: Visit date not found   Next office visit with prescribing clinician: 11/30/2023      Additional details provided by patient: PATIENT LOST HIS BOTTLE     Does the patient have less than a 3 day supply:  [x] Yes  [] No     Would you like a call back once the refill request has been completed: [] Yes [x] No     If the office needs to give you a call back, can they leave a voicemail: [] Yes [x] No

## 2023-11-30 ENCOUNTER — OFFICE VISIT (OUTPATIENT)
Dept: INTERNAL MEDICINE | Facility: CLINIC | Age: 78
End: 2023-11-30
Payer: MEDICARE

## 2023-11-30 ENCOUNTER — TELEPHONE (OUTPATIENT)
Dept: INTERNAL MEDICINE | Facility: CLINIC | Age: 78
End: 2023-11-30

## 2023-11-30 ENCOUNTER — LAB (OUTPATIENT)
Dept: INTERNAL MEDICINE | Facility: CLINIC | Age: 78
End: 2023-11-30
Payer: MEDICARE

## 2023-11-30 VITALS
DIASTOLIC BLOOD PRESSURE: 70 MMHG | OXYGEN SATURATION: 98 % | SYSTOLIC BLOOD PRESSURE: 136 MMHG | HEIGHT: 71 IN | RESPIRATION RATE: 14 BRPM | TEMPERATURE: 97.5 F | WEIGHT: 185.8 LBS | HEART RATE: 65 BPM | BODY MASS INDEX: 26.01 KG/M2

## 2023-11-30 DIAGNOSIS — E78.5 DYSLIPIDEMIA: ICD-10-CM

## 2023-11-30 DIAGNOSIS — F90.9 ATTENTION DEFICIT HYPERACTIVITY DISORDER (ADHD), UNSPECIFIED ADHD TYPE: ICD-10-CM

## 2023-11-30 DIAGNOSIS — R68.82 LOW LIBIDO: ICD-10-CM

## 2023-11-30 DIAGNOSIS — R53.83 OTHER FATIGUE: ICD-10-CM

## 2023-11-30 DIAGNOSIS — I10 HYPERTENSION, UNSPECIFIED TYPE: Primary | ICD-10-CM

## 2023-11-30 DIAGNOSIS — I10 HYPERTENSION, UNSPECIFIED TYPE: ICD-10-CM

## 2023-11-30 PROCEDURE — 99214 OFFICE O/P EST MOD 30 MIN: CPT | Performed by: STUDENT IN AN ORGANIZED HEALTH CARE EDUCATION/TRAINING PROGRAM

## 2023-11-30 PROCEDURE — 1160F RVW MEDS BY RX/DR IN RCRD: CPT | Performed by: STUDENT IN AN ORGANIZED HEALTH CARE EDUCATION/TRAINING PROGRAM

## 2023-11-30 PROCEDURE — 1159F MED LIST DOCD IN RCRD: CPT | Performed by: STUDENT IN AN ORGANIZED HEALTH CARE EDUCATION/TRAINING PROGRAM

## 2023-11-30 PROCEDURE — 36415 COLL VENOUS BLD VENIPUNCTURE: CPT | Performed by: STUDENT IN AN ORGANIZED HEALTH CARE EDUCATION/TRAINING PROGRAM

## 2023-11-30 RX ORDER — DEXTROAMPHETAMINE SACCHARATE, AMPHETAMINE ASPARTATE, DEXTROAMPHETAMINE SULFATE AND AMPHETAMINE SULFATE 5; 5; 5; 5 MG/1; MG/1; MG/1; MG/1
20 TABLET ORAL 3 TIMES DAILY
Qty: 270 TABLET | Refills: 0 | Status: SHIPPED | OUTPATIENT
Start: 2023-11-30 | End: 2023-12-01 | Stop reason: SDUPTHER

## 2023-11-30 NOTE — TELEPHONE ENCOUNTER
Caller: Nicholas Post    Relationship to patient: Self    Best call back number: [phone]     PATIENT STATES THAT THE ADDERAL WAS SENT TO THE WRONG PHARMACY.  IT IS TO BE SENT TO [facility].

## 2023-11-30 NOTE — PROGRESS NOTES
Office Note     Name: Nicholas Post    : 1945     MRN: [id number]     Chief Complaint  Hypertension (3 month recheck today )    Subjective     History of Present Illness:  Nicholas oPst is a 78 y.o. male who presents today for          HTN  Controlled in office 120/62  Currently taking Prinzide 10-12.5mg  No headaches, no chest pain, no shortness of breath, no dizziness, no syncope.     ADHD  Was seen at [facility].  He is on Adderall 20mg three times daily for ADHD.    He has been on regimen since   Self employed, was having difficulties with concentration with work.  Affected his personal life, would have difficulties with multiple projections. Would never complete.  Since being on this regiment, his work has improved. His most significant change has been in personal life, feels like his life is more focused.     Erectile Dysfunction  Takeing viagra 100mg prn    Fatigue  Concerned about fatiguing easily, lives an active lifestyle, eats healthy, but feels like he tires easily  Likes to work outdoor, and exercise. Feels like he doesn't gain any muscle mass.      Review of Systems:   Review of Systems   All other systems reviewed and are negative.      Past Medical History:   Past Medical History:   Diagnosis Date    Back pain     GERD (gastroesophageal reflux disease)     Herpes     Hypertension     Osteoarthritis     Wears reading eyeglasses        Past Surgical History:   Past Surgical History:   Procedure Laterality Date    BACK SURGERY      Lumbar discectomy-      COLONOSCOPY      HAND SURGERY Left     pin    HERNIA REPAIR      KNEE ARTHROSCOPY Left     LUMBAR LAMINECTOMY WITH FUSION N/A 9/3/2020    Procedure: LUMBAR FUSION DECOMPRESSON WITH PEDICLE SCREWS L4-5;  Surgeon: Campbell Murillo MD;  Location: [facility];  Service: Neurosurgery;  Laterality: N/A;    ROTATOR CUFF REPAIR Bilateral     WISDOM TOOTH EXTRACTION         Family History:  "  Family History   Problem Relation Age of Onset    Cancer Mother     Stroke Mother     Heart attack Father        Social History:   Social History     Socioeconomic History    Marital status: Significant Other   Tobacco Use    Smoking status: Never    Smokeless tobacco: Never   Vaping Use    Vaping Use: Never used   Substance and Sexual Activity    Alcohol use: Yes     Comment: 7 drinks weekly     Drug use: Yes     Types: Marijuana     Comment: occasional, last use 9-1-20    Sexual activity: Defer       Immunizations:   Immunization History   Administered Date(s) Administered    COVID-19 (PFIZER) Purple Cap Monovalent 11/01/2021        Medications:     Current Outpatient Medications:     acyclovir (ZOVIRAX) 200 MG capsule, Take 1 capsule by mouth As Needed (herpes flair up)., Disp: , Rfl:     lisinopril-hydrochlorothiazide (PRINZIDE,ZESTORETIC) 10-12.5 MG per tablet, Take 1 tablet by mouth Daily., Disp: 90 tablet, Rfl: 3    omeprazole (priLOSEC) 40 MG capsule, Take 1 capsule by mouth Daily., Disp: 90 capsule, Rfl: 1    sildenafil (VIAGRA) 100 MG tablet, Take 1 tablet by mouth Daily As Needed for Erectile Dysfunction., Disp: , Rfl:     traZODone (DESYREL) 300 MG tablet, Take 1 tablet by mouth Every Night., Disp: 90 tablet, Rfl: 0    amphetamine-dextroamphetamine (Adderall) 20 MG tablet, Take 1 tablet by mouth 3 (Three) Times a Day., Disp: 270 tablet, Rfl: 0    Allergies:   Allergies   Allergen Reactions    Poison Ivy Extract Rash       Objective     Vital Signs  /70   Pulse 65   Temp 97.5 °F (36.4 °C) (Infrared)   Resp 14   Ht 179.1 cm (70.51\")   Wt 84.3 kg (185 lb 12.8 oz)   SpO2 98%   BMI 26.27 kg/m²   Estimated body mass index is 26.27 kg/m² as calculated from the following:    Height as of this encounter: 179.1 cm (70.51\").    Weight as of this encounter: 84.3 kg (185 lb 12.8 oz).          Physical Exam  Constitutional:       Appearance: Normal appearance.   Cardiovascular:      Rate and Rhythm: " Normal rate and regular rhythm.      Pulses: Normal pulses.      Heart sounds: Normal heart sounds.   Pulmonary:      Effort: Pulmonary effort is normal.      Breath sounds: Normal breath sounds.   Neurological:      Mental Status: He is alert.          Result Review :                  Assessment and Plan     1. Hypertension, unspecified type  Controlled  Continue with medications as prescribed  - Basic metabolic panel; Future    2. Low libido    - Testosterone; Future  - Vitamin B12; Future  - Magnesium; Future  - FSH & LH; Future    3. Other fatigue    - Testosterone; Future  - Vitamin B12; Future  - Magnesium; Future  - FSH & LH; Future    4. Attention deficit hyperactivity disorder (ADHD), unspecified ADHD type  Refill medications: Adderall 20mg Tid daily  Miguel reviewed      5. Dyslipidemia  recheck  - Lipid panel; Future       Follow Up  No follow-ups on file.    MD KULWANT [facility] [address]  [facility]  [address]  [address] [address]  [phone]

## 2023-12-01 DIAGNOSIS — F90.9 ATTENTION DEFICIT HYPERACTIVITY DISORDER (ADHD), UNSPECIFIED ADHD TYPE: ICD-10-CM

## 2023-12-01 LAB
BUN SERPL-MCNC: 22 MG/DL (ref 8–23)
BUN/CREAT SERPL: 22.2 (ref 7–25)
CALCIUM SERPL-MCNC: 9.2 MG/DL (ref 8.6–10.5)
CHLORIDE SERPL-SCNC: 107 MMOL/L (ref 98–107)
CHOLEST SERPL-MCNC: 197 MG/DL (ref 0–200)
CO2 SERPL-SCNC: 25.1 MMOL/L (ref 22–29)
CREAT SERPL-MCNC: 0.99 MG/DL (ref 0.76–1.27)
EGFRCR SERPLBLD CKD-EPI 2021: 78.9 ML/MIN/1.73
FSH SERPL-ACNC: 5.9 MIU/ML (ref 1.5–12.4)
GLUCOSE SERPL-MCNC: 71 MG/DL (ref 65–99)
HDLC SERPL-MCNC: 57 MG/DL (ref 40–60)
LDLC SERPL CALC-MCNC: 127 MG/DL (ref 0–100)
LH SERPL-ACNC: 5.2 MIU/ML (ref 1.7–8.6)
MAGNESIUM SERPL-MCNC: 2.3 MG/DL (ref 1.6–2.4)
POTASSIUM SERPL-SCNC: 5 MMOL/L (ref 3.5–5.2)
SODIUM SERPL-SCNC: 142 MMOL/L (ref 136–145)
TESTOST SERPL-MCNC: 507 NG/DL (ref 264–916)
TRIGL SERPL-MCNC: 74 MG/DL (ref 0–150)
VIT B12 SERPL-MCNC: 409 PG/ML (ref 211–946)
VLDLC SERPL CALC-MCNC: 13 MG/DL (ref 5–40)

## 2023-12-01 RX ORDER — DEXTROAMPHETAMINE SACCHARATE, AMPHETAMINE ASPARTATE, DEXTROAMPHETAMINE SULFATE AND AMPHETAMINE SULFATE 5; 5; 5; 5 MG/1; MG/1; MG/1; MG/1
20 TABLET ORAL 3 TIMES DAILY
Qty: 270 TABLET | Refills: 0 | Status: SHIPPED | OUTPATIENT
Start: 2023-12-01

## 2023-12-04 ENCOUNTER — TELEPHONE (OUTPATIENT)
Dept: INTERNAL MEDICINE | Facility: CLINIC | Age: 78
End: 2023-12-04

## 2023-12-20 DIAGNOSIS — G47.00 INSOMNIA, UNSPECIFIED TYPE: Primary | ICD-10-CM

## 2023-12-20 RX ORDER — TRAZODONE HYDROCHLORIDE 300 MG/1
300 TABLET ORAL NIGHTLY
Qty: 90 TABLET | Refills: 2 | Status: SHIPPED | OUTPATIENT
Start: 2023-12-20

## 2024-02-29 ENCOUNTER — OFFICE VISIT (OUTPATIENT)
Dept: INTERNAL MEDICINE | Facility: CLINIC | Age: 79
End: 2024-02-29
Payer: MEDICARE

## 2024-02-29 VITALS
WEIGHT: 184.2 LBS | DIASTOLIC BLOOD PRESSURE: 74 MMHG | BODY MASS INDEX: 25.79 KG/M2 | HEIGHT: 71 IN | OXYGEN SATURATION: 95 % | SYSTOLIC BLOOD PRESSURE: 118 MMHG | TEMPERATURE: 96.8 F | HEART RATE: 77 BPM

## 2024-02-29 DIAGNOSIS — G47.00 INSOMNIA, UNSPECIFIED TYPE: ICD-10-CM

## 2024-02-29 DIAGNOSIS — F90.9 ATTENTION DEFICIT HYPERACTIVITY DISORDER (ADHD), UNSPECIFIED ADHD TYPE: ICD-10-CM

## 2024-02-29 DIAGNOSIS — N52.9 ERECTILE DYSFUNCTION, UNSPECIFIED ERECTILE DYSFUNCTION TYPE: Primary | ICD-10-CM

## 2024-02-29 PROCEDURE — 1159F MED LIST DOCD IN RCRD: CPT | Performed by: STUDENT IN AN ORGANIZED HEALTH CARE EDUCATION/TRAINING PROGRAM

## 2024-02-29 PROCEDURE — 1160F RVW MEDS BY RX/DR IN RCRD: CPT | Performed by: STUDENT IN AN ORGANIZED HEALTH CARE EDUCATION/TRAINING PROGRAM

## 2024-02-29 PROCEDURE — 99214 OFFICE O/P EST MOD 30 MIN: CPT | Performed by: STUDENT IN AN ORGANIZED HEALTH CARE EDUCATION/TRAINING PROGRAM

## 2024-02-29 RX ORDER — TRAZODONE HYDROCHLORIDE 300 MG/1
300 TABLET ORAL NIGHTLY
Qty: 90 TABLET | Refills: 3 | Status: SHIPPED | OUTPATIENT
Start: 2024-02-29

## 2024-02-29 RX ORDER — SILDENAFIL 100 MG/1
100 TABLET, FILM COATED ORAL DAILY PRN
Qty: 90 TABLET | Refills: 3 | Status: SHIPPED | OUTPATIENT
Start: 2024-02-29

## 2024-02-29 RX ORDER — TRAZODONE HYDROCHLORIDE 300 MG/1
300 TABLET ORAL NIGHTLY
Qty: 90 TABLET | Refills: 3 | Status: SHIPPED | OUTPATIENT
Start: 2024-02-29 | End: 2024-02-29 | Stop reason: SDUPTHER

## 2024-02-29 RX ORDER — DEXTROAMPHETAMINE SACCHARATE, AMPHETAMINE ASPARTATE, DEXTROAMPHETAMINE SULFATE AND AMPHETAMINE SULFATE 5; 5; 5; 5 MG/1; MG/1; MG/1; MG/1
20 TABLET ORAL 3 TIMES DAILY
Qty: 270 TABLET | Refills: 0 | Status: SHIPPED | OUTPATIENT
Start: 2024-03-06

## 2024-02-29 RX ORDER — SILDENAFIL 100 MG/1
100 TABLET, FILM COATED ORAL DAILY PRN
Qty: 90 TABLET | Refills: 3 | Status: SHIPPED | OUTPATIENT
Start: 2024-02-29 | End: 2024-02-29

## 2024-02-29 RX ORDER — MELOXICAM 15 MG/1
1 TABLET ORAL DAILY
COMMUNITY
Start: 2024-01-29

## 2024-02-29 NOTE — PROGRESS NOTES
Office Note     Name: Nicholas Post    : 1945     MRN: [id number]     Chief Complaint  Hypertension, ADHD, and Med Refill    Subjective     History of Present Illness:  Nicholas Post is a 79 y.o. male who presents today for     HTN  Controlled in office 118/74  Currently taking Prinzide 10-12.5mg  No headaches, no chest pain, no shortness of breath, no dizziness, no syncope.     ADHD  Was seen at [facility].  He is on Adderall 20mg three times daily for ADHD.    He has been on regimen since   Self employed, was having difficulties with concentration with work.  Affected his personal life, would have difficulties with multiple projections. Would never complete.  Since being on this regiment, his work has improved. His most significant change has been in personal life, feels like his life is more focused.     Erectile Dysfunction  Takeing viagra 100mg prn           Review of Systems:   Review of Systems   All other systems reviewed and are negative.      Past Medical History:   Past Medical History:   Diagnosis Date    Back pain     GERD (gastroesophageal reflux disease)     Herpes     Hypertension     Osteoarthritis     Wears reading eyeglasses        Past Surgical History:   Past Surgical History:   Procedure Laterality Date    BACK SURGERY      Lumbar discectomy-      COLONOSCOPY      HAND SURGERY Left     pin    HERNIA REPAIR      KNEE ARTHROSCOPY Left     LUMBAR LAMINECTOMY WITH FUSION N/A 9/3/2020    Procedure: LUMBAR FUSION DECOMPRESSON WITH PEDICLE SCREWS L4-5;  Surgeon: Campbell Murillo MD;  Location: [facility];  Service: Neurosurgery;  Laterality: N/A;    ROTATOR CUFF REPAIR Bilateral     WISDOM TOOTH EXTRACTION         Family History:   Family History   Problem Relation Age of Onset    Cancer Mother     Stroke Mother     Heart attack Father        Social History:   Social History     Socioeconomic History    Marital status: Significant Other  "  Tobacco Use    Smoking status: Never    Smokeless tobacco: Never   Vaping Use    Vaping status: Never Used   Substance and Sexual Activity    Alcohol use: Yes     Comment: 7 drinks weekly     Drug use: Yes     Types: Marijuana     Comment: occasional, last use 9-1-20    Sexual activity: Defer       Immunizations:   Immunization History   Administered Date(s) Administered    COVID-19 (PFIZER) Purple Cap Monovalent 11/01/2021        Medications:     Current Outpatient Medications:     acyclovir (ZOVIRAX) 200 MG capsule, Take 1 capsule by mouth As Needed (herpes flair up)., Disp: , Rfl:     amphetamine-dextroamphetamine (Adderall) 20 MG tablet, Take 1 tablet by mouth 3 (Three) Times a Day., Disp: 270 tablet, Rfl: 0    lisinopril-hydrochlorothiazide (PRINZIDE,ZESTORETIC) 10-12.5 MG per tablet, Take 1 tablet by mouth Daily., Disp: 90 tablet, Rfl: 3    meloxicam (MOBIC) 15 MG tablet, Take 1 tablet by mouth Daily., Disp: , Rfl:     omeprazole (priLOSEC) 40 MG capsule, Take 1 capsule by mouth Daily., Disp: 90 capsule, Rfl: 1    sildenafil (Viagra) 100 MG tablet, Take 1 tablet by mouth Daily As Needed for Erectile Dysfunction., Disp: 90 tablet, Rfl: 3    traZODone (DESYREL) 300 MG tablet, Take 1 tablet by mouth Every Night., Disp: 90 tablet, Rfl: 3    Allergies:   Allergies   Allergen Reactions    Poison Ivy Extract Rash       Objective     Vital Signs  /74   Pulse 77   Temp 96.8 °F (36 °C) (Temporal)   Ht 179.1 cm (70.51\")   Wt 83.6 kg (184 lb 3.2 oz)   SpO2 95%   BMI 26.05 kg/m²   Estimated body mass index is 26.05 kg/m² as calculated from the following:    Height as of this encounter: 179.1 cm (70.51\").    Weight as of this encounter: 83.6 kg (184 lb 3.2 oz).          Physical Exam  Constitutional:       Appearance: Normal appearance.   Cardiovascular:      Rate and Rhythm: Normal rate and regular rhythm.      Pulses: Normal pulses.      Heart sounds: Normal heart sounds.   Pulmonary:      Effort: Pulmonary " effort is normal.      Breath sounds: Normal breath sounds.   Neurological:      Mental Status: He is alert.          Result Review :                  Assessment and Plan     1. Attention deficit hyperactivity disorder (ADHD), unspecified ADHD type  Controlled, stanley reviewed  Continue with dose  - amphetamine-dextroamphetamine (Adderall) 20 MG tablet; Take 1 tablet by mouth 3 (Three) Times a Day.  Dispense: 270 tablet; Refill: 0    2. Insomnia, unspecified type  Med refilled  - traZODone (DESYREL) 300 MG tablet; Take 1 tablet by mouth Every Night.  Dispense: 90 tablet; Refill: 3    3. Erectile dysfunction, unspecified erectile dysfunction type  Med refill  - sildenafil (Viagra) 100 MG tablet; Take 1 tablet by mouth Daily As Needed for Erectile Dysfunction.  Dispense: 90 tablet; Refill: 3       Follow Up  Follow up 3 months  No follow-ups on file.    Campbell Jackson MD  [facility] [address]  [facility]  [address]  [address] [address] [phone]

## 2024-05-07 ENCOUNTER — TELEPHONE (OUTPATIENT)
Dept: INTERNAL MEDICINE | Facility: CLINIC | Age: 79
End: 2024-05-07
Payer: MEDICARE

## 2024-05-07 DIAGNOSIS — G47.00 INSOMNIA, UNSPECIFIED TYPE: ICD-10-CM

## 2024-05-07 DIAGNOSIS — F90.9 ATTENTION DEFICIT HYPERACTIVITY DISORDER (ADHD), UNSPECIFIED ADHD TYPE: ICD-10-CM

## 2024-05-07 RX ORDER — DEXTROAMPHETAMINE SACCHARATE, AMPHETAMINE ASPARTATE, DEXTROAMPHETAMINE SULFATE AND AMPHETAMINE SULFATE 5; 5; 5; 5 MG/1; MG/1; MG/1; MG/1
20 TABLET ORAL 3 TIMES DAILY
Qty: 270 TABLET | Refills: 0 | Status: CANCELLED | OUTPATIENT
Start: 2024-05-07

## 2024-05-08 ENCOUNTER — TELEPHONE (OUTPATIENT)
Dept: INTERNAL MEDICINE | Facility: CLINIC | Age: 79
End: 2024-05-08

## 2024-05-08 DIAGNOSIS — F90.9 ATTENTION DEFICIT HYPERACTIVITY DISORDER (ADHD), UNSPECIFIED ADHD TYPE: ICD-10-CM

## 2024-05-08 RX ORDER — TRAZODONE HYDROCHLORIDE 300 MG/1
300 TABLET ORAL NIGHTLY
Qty: 90 TABLET | Refills: 3 | OUTPATIENT
Start: 2024-05-08

## 2024-05-08 RX ORDER — TRIAMCINOLONE ACETONIDE 1 MG/G
1 OINTMENT TOPICAL DAILY PRN
Qty: 80 G | Refills: 0 | Status: SHIPPED | OUTPATIENT
Start: 2024-05-08

## 2024-05-08 RX ORDER — DEXTROAMPHETAMINE SACCHARATE, AMPHETAMINE ASPARTATE, DEXTROAMPHETAMINE SULFATE AND AMPHETAMINE SULFATE 5; 5; 5; 5 MG/1; MG/1; MG/1; MG/1
20 TABLET ORAL 3 TIMES DAILY
Qty: 90 TABLET | Refills: 0 | Status: SHIPPED | OUTPATIENT
Start: 2024-05-08

## 2024-05-08 RX ORDER — PREDNISONE 20 MG/1
TABLET ORAL
Qty: 19 TABLET | Refills: 0 | Status: SHIPPED | OUTPATIENT
Start: 2024-05-08 | End: 2024-05-21

## 2024-05-08 NOTE — TELEPHONE ENCOUNTER
PT CALLED IN, STATES HE HAS POISON IVY IN SEVERAL AREAS AND IS REQUESTING A MED PACK SINCE HE'S BEEN PRESCRIBED THAT IN THE PAST AND ITS WORKED GREAT. PLEASE CALL @ [phone] OR [phone] TO LET HIM KNOW. THANKS

## 2024-05-08 NOTE — TELEPHONE ENCOUNTER
I have asked the patient to make an appointment to be seen for the poison ivy.  Patient states Dr. Jackson said he would send this in for him anytime he needed.  Patient did not  the script from 3/6/24 for adderall it was expensive.   Can you send a 30 day fill on his adderall.  He was last seen on 2/29/24; NV: 5/30/24.

## 2024-05-08 NOTE — TELEPHONE ENCOUNTER
Caller: PostNicholas    Relationship: Self    Best call back number: [phone]     What is the best time to reach you: ANY    Who are you requesting to speak with (clinical staff, provider,  specific staff member): ARMOND    Do you know the name of the person who called: NO    What was the call regarding: THE PATIENT WAS JUST ON THE PHONE WITH ARMOND AND WHEN HE GOT OFF THE CALL HE HAD MISSED ANOTHER FROM THE OFFICE. HE IS NOT SURE IF THIS WAS AN ACCIDENT OR IF ARMOND NEEDED TO SPEAK TO HIM AGAIN.     Is it okay if the provider responds through MyChart:

## 2024-05-16 DIAGNOSIS — G47.00 INSOMNIA, UNSPECIFIED TYPE: ICD-10-CM

## 2024-05-16 NOTE — TELEPHONE ENCOUNTER
Caller: Nicholas Post    Relationship: Self    Best call back number: [phone]     Requested Prescriptions:   Requested Prescriptions     Pending Prescriptions Disp Refills    traZODone (DESYREL) 300 MG tablet 90 tablet 3     Sig: Take 1 tablet by mouth Every Night.        Pharmacy where request should be sent: [facility] #[id number] [place] [address] [address] AT [facility] [address] - [phone]  - [phone]      Last office visit with prescribing clinician: 2/29/2024   Last telemedicine visit with prescribing clinician: Visit date not found   Next office visit with prescribing clinician: Visit date not found     Additional details provided by patient: PATIENT IS GOING OUT OF TOWN ON 5/21/24 AND WILL BE GONE FOR ABOUT 3 WEEKS AT LEAST. PATIENT WILL NEED AN EARLY REFILL.     Does the patient have less than a 3 day supply:  [] Yes  [x] No    Would you like a call back once the refill request has been completed: [] Yes [x] No    If the office needs to give you a call back, can they leave a voicemail: [] Yes [x] No    Cadance Dunaway, RegSched Rep   05/16/24 15:59 EDT

## 2024-05-17 RX ORDER — TRAZODONE HYDROCHLORIDE 300 MG/1
300 TABLET ORAL NIGHTLY
Qty: 90 TABLET | Refills: 0 | Status: SHIPPED | OUTPATIENT
Start: 2024-05-17

## 2024-05-17 NOTE — TELEPHONE ENCOUNTER
Called pt to inform all meds were recently refilled or have refills on them, pt stated will leave stated and will be gone for 3-4 weeks and needs an early refill called in to pharmacy for trazodone

## 2024-06-18 ENCOUNTER — TELEPHONE (OUTPATIENT)
Dept: INTERNAL MEDICINE | Facility: CLINIC | Age: 79
End: 2024-06-18
Payer: MEDICARE

## 2024-07-05 ENCOUNTER — OFFICE VISIT (OUTPATIENT)
Dept: INTERNAL MEDICINE | Facility: CLINIC | Age: 79
End: 2024-07-05
Payer: MEDICARE

## 2024-07-05 VITALS
HEIGHT: 71 IN | TEMPERATURE: 96.6 F | WEIGHT: 180.8 LBS | BODY MASS INDEX: 25.31 KG/M2 | DIASTOLIC BLOOD PRESSURE: 60 MMHG | OXYGEN SATURATION: 96 % | SYSTOLIC BLOOD PRESSURE: 120 MMHG | HEART RATE: 70 BPM

## 2024-07-05 DIAGNOSIS — R20.2 PARESTHESIAS: ICD-10-CM

## 2024-07-05 DIAGNOSIS — R09.89 OTHER SPECIFIED SYMPTOMS AND SIGNS INVOLVING THE CIRCULATORY AND RESPIRATORY SYSTEMS: ICD-10-CM

## 2024-07-05 DIAGNOSIS — M85.862 OTHER SPECIFIED DISORDERS OF BONE DENSITY AND STRUCTURE, LEFT LOWER LEG: ICD-10-CM

## 2024-07-05 DIAGNOSIS — Z13.820 SCREENING FOR OSTEOPOROSIS: ICD-10-CM

## 2024-07-05 DIAGNOSIS — Z00.00 MEDICARE ANNUAL WELLNESS VISIT, SUBSEQUENT: Primary | ICD-10-CM

## 2024-07-05 PROCEDURE — 96160 PT-FOCUSED HLTH RISK ASSMT: CPT | Performed by: STUDENT IN AN ORGANIZED HEALTH CARE EDUCATION/TRAINING PROGRAM

## 2024-07-05 PROCEDURE — 1160F RVW MEDS BY RX/DR IN RCRD: CPT | Performed by: STUDENT IN AN ORGANIZED HEALTH CARE EDUCATION/TRAINING PROGRAM

## 2024-07-05 PROCEDURE — 1159F MED LIST DOCD IN RCRD: CPT | Performed by: STUDENT IN AN ORGANIZED HEALTH CARE EDUCATION/TRAINING PROGRAM

## 2024-07-05 PROCEDURE — G0439 PPPS, SUBSEQ VISIT: HCPCS | Performed by: STUDENT IN AN ORGANIZED HEALTH CARE EDUCATION/TRAINING PROGRAM

## 2024-07-05 NOTE — PROGRESS NOTES
The ABCs of the Annual Wellness Visit  Subsequent Medicare Wellness Visit    Subjective      Nicholas Post is a 79 y.o. male who presents for a Subsequent Medicare Wellness Visit.    The following portions of the patient's history were reviewed and   updated as appropriate: allergies, current medications, past family history, past medical history, past social history, past surgical history, and problem list.    Compared to one year ago, the patient feels his physical   health is the same.    Compared to one year ago, the patient feels his mental   health is the same.    Recent Hospitalizations:  He was not admitted to the hospital during the last year.       Current Medical Providers:  Patient Care Team:  Campbell Jackson MD as PCP - General (Family Medicine)    Outpatient Medications Prior to Visit   Medication Sig Dispense Refill    acyclovir (ZOVIRAX) 200 MG capsule Take 1 capsule by mouth As Needed (herpes flair up).      amphetamine-dextroamphetamine (Adderall) 20 MG tablet Take 1 tablet by mouth 3 (Three) Times a Day. 90 tablet 0    lisinopril-hydrochlorothiazide (PRINZIDE,ZESTORETIC) 10-12.5 MG per tablet Take 1 tablet by mouth Daily. 90 tablet 3    omeprazole (priLOSEC) 40 MG capsule Take 1 capsule by mouth Daily. 90 capsule 1    sildenafil (Viagra) 100 MG tablet Take 1 tablet by mouth Daily As Needed for Erectile Dysfunction. 90 tablet 3    traZODone (DESYREL) 300 MG tablet Take 1 tablet by mouth Every Night. 90 tablet 0    triamcinolone (KENALOG) 0.1 % ointment Apply 1 Application topically to the appropriate area as directed Daily As Needed for Rash or Irritation. 80 g 0    meloxicam (MOBIC) 15 MG tablet Take 1 tablet by mouth Daily. (Patient not taking: Reported on 7/5/2024)       No facility-administered medications prior to visit.       No opioid medication identified on active medication list. I have reviewed chart for other potential  high risk medication/s and harmful drug interactions in  "the elderly.        Aspirin is not on active medication list.  Aspirin use is not indicated based on review of current medical condition/s. Risk of harm outweighs potential benefits.  .    Patient Active Problem List   Diagnosis    Spondylolisthesis, acquired     Advance Care Planning   Advance Care Planning     Advance Directive is on file.  ACP discussion was held with the patient during this visit. Patient has an advance directive in EMR which is still valid.      Objective    Vitals:    24 1431   BP: 120/60   Pulse: 70   Temp: 96.6 °F (35.9 °C)   TempSrc: Temporal   SpO2: 96%   Weight: 82 kg (180 lb 12.8 oz)   Height: 179.1 cm (70.51\")     Estimated body mass index is 25.57 kg/m² as calculated from the following:    Height as of this encounter: 179.1 cm (70.51\").    Weight as of this encounter: 82 kg (180 lb 12.8 oz).           Does the patient have evidence of cognitive impairment?   No            HEALTH RISK ASSESSMENT    Smoking Status:  Social History     Tobacco Use   Smoking Status Never   Smokeless Tobacco Never     Alcohol Consumption:  Social History     Substance and Sexual Activity   Alcohol Use Yes    Comment: 7 drinks weekly      Fall Risk Screen:    STEADI Fall Risk Assessment was completed, and patient is at LOW risk for falls.Assessment completed on:2024    Depression Screenin/5/2024     2:30 PM   PHQ-2/PHQ-9 Depression Screening   Little Interest or Pleasure in Doing Things 0-->not at all   Feeling Down, Depressed or Hopeless 0-->not at all   PHQ-9: Brief Depression Severity Measure Score 0       Health Habits and Functional and Cognitive Screenin/5/2024     2:26 PM   Functional & Cognitive Status   Do you have difficulty preparing food and eating? No   Do you have difficulty bathing yourself, getting dressed or grooming yourself? No   Do you have difficulty using the toilet? No   Do you have difficulty moving around from place to place? No   Do you have trouble with " steps or getting out of a bed or a chair? No   Current Diet Well Balanced Diet   Dental Exam Unknown   Eye Exam Up to date   Exercise (times per week) 7 times per week   Current Exercises Include Walking   Do you need help using the phone?  No   Are you deaf or do you have serious difficulty hearing?  Yes   Do you need help to go to places out of walking distance? No   Do you need help shopping? No   Do you need help preparing meals?  No   Do you need help with housework?  No   Do you need help with laundry? No   Do you need help taking your medications? No   Do you need help managing money? No   Do you ever drive or ride in a car without wearing a seat belt? No   Have you felt unusual stress, anger or loneliness in the last month? No   If you need help, do you have trouble finding someone available to you? No   Have you been bothered in the last four weeks by sexual problems? No   Do you have difficulty concentrating, remembering or making decisions? No       Age-appropriate Screening Schedule:  Refer to the list below for future screening recommendations based on patient's age, sex and/or medical conditions. Orders for these recommended tests are listed in the plan section. The patient has been provided with a written plan.    Health Maintenance   Topic Date Due    COLORECTAL CANCER SCREENING  Never done    TDAP/TD VACCINES (1 - Tdap) Never done    ZOSTER VACCINE (1 of 2) Never done    RSV Vaccine - Adults (1 - 1-dose 60+ series) Never done    Pneumococcal Vaccine 65+ (1 of 1 - PCV) Never done    ANNUAL WELLNESS VISIT  Never done    COVID-19 Vaccine (2 - 2023-24 season) 09/01/2023    INFLUENZA VACCINE  08/01/2024    BMI FOLLOWUP  07/05/2025    HEPATITIS C SCREENING  Completed                  CMS Preventative Services Quick Reference  Risk Factors Identified During Encounter:    None Identified    The above risks/problems have been discussed with the patient.  Pertinent information has been shared with the patient  in the After Visit Summary.    Diagnoses and all orders for this visit:    1. Screening for osteoporosis (Primary)  -     DEXA Bone Density Axial; Future  -     Diclofenac Sodium (VOLTAREN) 1 % gel gel; Apply 4 g topically to the appropriate area as directed 4 (Four) Times a Day As Needed (arthritis).  Dispense: 350 g; Refill: 0    2. Other specified disorders of bone density and structure, left lower leg  -     DEXA Bone Density Axial; Future    3. Paresthesias  -     Doppler Arterial Multi Level Lower Extremity - Bilateral CAR; Future    4. Other specified symptoms and signs involving the circulatory and respiratory systems  -     Doppler Arterial Multi Level Lower Extremity - Bilateral CAR; Future        Follow Up:   Next Medicare Wellness visit to be scheduled in 1 year.      An After Visit Summary and PPPS were made available to the patient.

## 2024-07-26 ENCOUNTER — TELEPHONE (OUTPATIENT)
Dept: INTERNAL MEDICINE | Facility: CLINIC | Age: 79
End: 2024-07-26
Payer: MEDICARE

## 2024-07-26 DIAGNOSIS — L23.7 POISON IVY: Primary | ICD-10-CM

## 2024-07-26 RX ORDER — BETAMETHASONE DIPROPIONATE 0.5 MG/G
1 CREAM TOPICAL 2 TIMES DAILY
Qty: 45 G | Refills: 0 | Status: SHIPPED | OUTPATIENT
Start: 2024-07-26

## 2024-07-26 RX ORDER — PREDNISONE 20 MG/1
TABLET ORAL
Qty: 32 TABLET | Refills: 0 | Status: SHIPPED | OUTPATIENT
Start: 2024-07-26 | End: 2024-08-13

## 2024-07-26 NOTE — TELEPHONE ENCOUNTER
Caller: Nicholas Post    Relationship to patient: Self    Best call back number: [phone]     Patient is needing: MED PACK FOR POISON OAK, OR PREDNISONE. PATIENT HAS HAD THIS MANY TIMES BEFORE. LOTS OF LITTLE BUMPS AROUND ANKLES, SOME SPREADING. HAS BEEN SEEN FOR THIS BEFORE, PATIENT CAN COME BY THE OFFICE IF PCP NEEDS TO SEE HIS ANKLES. PLEASE CALL WITH DECISION, PATIENT IS VERY UNCOMFORTABLE.       [facility] #[id number] - [place] [address] [address] AT [facility] [address] - [phone] [facility] [phone] FX

## 2024-08-14 DIAGNOSIS — Z13.820 SCREENING FOR OSTEOPOROSIS: ICD-10-CM

## 2024-08-20 DIAGNOSIS — F90.9 ATTENTION DEFICIT HYPERACTIVITY DISORDER (ADHD), UNSPECIFIED ADHD TYPE: ICD-10-CM

## 2024-08-20 RX ORDER — DEXTROAMPHETAMINE SACCHARATE, AMPHETAMINE ASPARTATE, DEXTROAMPHETAMINE SULFATE AND AMPHETAMINE SULFATE 5; 5; 5; 5 MG/1; MG/1; MG/1; MG/1
20 TABLET ORAL 3 TIMES DAILY
Qty: 90 TABLET | Refills: 0 | Status: SHIPPED | OUTPATIENT
Start: 2024-08-20

## 2024-08-20 NOTE — TELEPHONE ENCOUNTER
Caller: Nicholas Post    Relationship: Self    Best call back number: [phone]     Requested Prescriptions:   Requested Prescriptions     Pending Prescriptions Disp Refills    amphetamine-dextroamphetamine (Adderall) 20 MG tablet 90 tablet 0     Sig: Take 1 tablet by mouth 3 (Three) Times a Day.        Pharmacy where request should be sent: [facility] #[id number] - [address] - [phone]  - [phone] FX     Last office visit with prescribing clinician: 7/5/2024   Last telemedicine visit with prescribing clinician: Visit date not found   Next office visit with prescribing clinician: Visit date not found     Additional details provided by patient: PATIENT IS OUT OF THE MEDICATION     Does the patient have less than a 3 day supply:  [x] Yes  [] No    Would you like a call back once the refill request has been completed: [x] Yes [] No    If the office needs to give you a call back, can they leave a voicemail: [x] Yes [] No    Marialuisa Rowan Rep   08/20/24 11:02 EDT

## 2024-08-20 NOTE — TELEPHONE ENCOUNTER
Rx Refill Note  Requested Prescriptions     Pending Prescriptions Disp Refills    amphetamine-dextroamphetamine (Adderall) 20 MG tablet 90 tablet 0     Sig: Take 1 tablet by mouth 3 (Three) Times a Day.      Last office visit with prescribing clinician: 7/5/2024   Last telemedicine visit with prescribing clinician: Visit date not found   Next office visit with prescribing clinician: Visit date not found       Gina Bryson MA  08/20/24, 11:48 EDT

## 2024-09-05 DIAGNOSIS — I10 HYPERTENSION, UNSPECIFIED TYPE: ICD-10-CM

## 2024-09-05 RX ORDER — LISINOPRIL/HYDROCHLOROTHIAZIDE 10-12.5 MG
1 TABLET ORAL DAILY
Qty: 90 TABLET | Refills: 3 | Status: SHIPPED | OUTPATIENT
Start: 2024-09-05

## 2024-09-05 NOTE — TELEPHONE ENCOUNTER
Caller: Nicholas Post    Relationship: Self    Best call back number: [phone]     Requested Prescriptions:   Requested Prescriptions     Pending Prescriptions Disp Refills    lisinopril-hydrochlorothiazide (PRINZIDE,ZESTORETIC) 10-12.5 MG per tablet 90 tablet 3     Sig: Take 1 tablet by mouth Daily.        Pharmacy where request should be sent: [facility] #[id number] [place], [place] - [address] AT [facility] [address] - [phone]  - [phone] FX     Last office visit with prescribing clinician: 7/5/2024   Last telemedicine visit with prescribing clinician: Visit date not found   Next office visit with prescribing clinician: Visit date not found     Additional details provided by patient: PATIENT HAS CALLED REQUESTING A NEW 90 DAY PRESCRIPTION LIZETH ABOVE MEDICATION. PRESCRIPTION MAY HAD BEEN PRESCRIBED BY ANOTHER PROVIDER. PATIENT IS REQUESTING PRESCRIPTION TO BE FILLED BY DR. MAHER.    Does the patient have less than a 3 day supply:  [x] Yes  [] No    Would you like a call back once the refill request has been completed: [] Yes [x] No    If the office needs to give you a call back, can they leave a voicemail: [] Yes [x] No    Calixto Jordan   09/05/24 14:40 EDT

## 2024-09-05 NOTE — TELEPHONE ENCOUNTER
Rx Refill Note  Requested Prescriptions     Pending Prescriptions Disp Refills    lisinopril-hydrochlorothiazide (PRINZIDE,ZESTORETIC) 10-12.5 MG per tablet 90 tablet 3     Sig: Take 1 tablet by mouth Daily.      Last office visit with prescribing clinician: 7/5/2024   Last telemedicine visit with prescribing clinician: Visit date not found   Next office visit with prescribing clinician: Visit date not found       Gina Bryson MA  09/05/24, 15:51 EDT

## 2024-10-14 ENCOUNTER — HOSPITAL ENCOUNTER (OUTPATIENT)
Dept: CARDIOLOGY | Facility: HOSPITAL | Age: 79
Discharge: HOME | End: 2024-10-14
Admitting: STUDENT IN AN ORGANIZED HEALTH CARE EDUCATION/TRAINING PROGRAM
Payer: MEDICARE

## 2024-10-14 DIAGNOSIS — R20.2 PARESTHESIAS: ICD-10-CM

## 2024-10-14 DIAGNOSIS — R09.89 OTHER SPECIFIED SYMPTOMS AND SIGNS INVOLVING THE CIRCULATORY AND RESPIRATORY SYSTEMS: ICD-10-CM

## 2024-10-14 LAB
BH CV LOWER ARTERIAL LEFT ABI RATIO: 1.17
BH CV LOWER ARTERIAL LEFT DORSALIS PEDIS SYS MAX: 150
BH CV LOWER ARTERIAL LEFT GREAT TOE SYS MAX: 94
BH CV LOWER ARTERIAL LEFT POST TIBIAL SYS MAX: 152
BH CV LOWER ARTERIAL LEFT TBI RATIO: 0.72
BH CV LOWER ARTERIAL RIGHT ABI RATIO: 1.13
BH CV LOWER ARTERIAL RIGHT DORSALIS PEDIS SYS MAX: 147
BH CV LOWER ARTERIAL RIGHT GREAT TOE SYS MAX: 119
BH CV LOWER ARTERIAL RIGHT POST TIBIAL SYS MAX: 144
BH CV LOWER ARTERIAL RIGHT TBI RATIO: 0.92
UPPER ARTERIAL LEFT ARM BRACHIAL SYS MAX: 124
UPPER ARTERIAL RIGHT ARM BRACHIAL SYS MAX: 130

## 2024-10-14 PROCEDURE — 93923 UPR/LXTR ART STDY 3+ LVLS: CPT | Performed by: INTERNAL MEDICINE

## 2024-10-14 PROCEDURE — 93923 UPR/LXTR ART STDY 3+ LVLS: CPT

## 2024-10-18 ENCOUNTER — HOSPITAL ENCOUNTER (OUTPATIENT)
Dept: BONE DENSITY | Facility: HOSPITAL | Age: 79
Discharge: HOME | End: 2024-10-18
Payer: MEDICARE

## 2024-10-18 DIAGNOSIS — M85.862 OTHER SPECIFIED DISORDERS OF BONE DENSITY AND STRUCTURE, LEFT LOWER LEG: ICD-10-CM

## 2024-10-18 DIAGNOSIS — Z13.820 SCREENING FOR OSTEOPOROSIS: ICD-10-CM

## 2024-10-18 PROCEDURE — 77080 DXA BONE DENSITY AXIAL: CPT

## 2024-10-22 DIAGNOSIS — M81.0 OSTEOPOROSIS, UNSPECIFIED OSTEOPOROSIS TYPE, UNSPECIFIED PATHOLOGICAL FRACTURE PRESENCE: Primary | ICD-10-CM

## 2024-11-14 ENCOUNTER — OFFICE VISIT (OUTPATIENT)
Dept: INTERNAL MEDICINE | Facility: CLINIC | Age: 79
End: 2024-11-14
Payer: MEDICARE

## 2024-11-14 ENCOUNTER — LAB (OUTPATIENT)
Dept: INTERNAL MEDICINE | Facility: CLINIC | Age: 79
End: 2024-11-14
Payer: MEDICARE

## 2024-11-14 VITALS
BODY MASS INDEX: 25.37 KG/M2 | SYSTOLIC BLOOD PRESSURE: 114 MMHG | HEART RATE: 55 BPM | TEMPERATURE: 97.7 F | DIASTOLIC BLOOD PRESSURE: 70 MMHG | HEIGHT: 71 IN | OXYGEN SATURATION: 95 % | WEIGHT: 181.2 LBS

## 2024-11-14 DIAGNOSIS — I10 HYPERTENSION, UNSPECIFIED TYPE: ICD-10-CM

## 2024-11-14 DIAGNOSIS — M89.9 DISORDER OF BONE, UNSPECIFIED: ICD-10-CM

## 2024-11-14 DIAGNOSIS — R25.2 MUSCLE CRAMP: ICD-10-CM

## 2024-11-14 DIAGNOSIS — M85.851 OSTEOPENIA OF NECK OF RIGHT FEMUR: ICD-10-CM

## 2024-11-14 DIAGNOSIS — F90.9 ATTENTION DEFICIT HYPERACTIVITY DISORDER (ADHD), UNSPECIFIED ADHD TYPE: Primary | ICD-10-CM

## 2024-11-14 DIAGNOSIS — M81.0 OSTEOPOROSIS WITHOUT CURRENT PATHOLOGICAL FRACTURE, UNSPECIFIED OSTEOPOROSIS TYPE: ICD-10-CM

## 2024-11-14 DIAGNOSIS — M25.50 ARTHRALGIA, UNSPECIFIED JOINT: ICD-10-CM

## 2024-11-14 LAB
25(OH)D3 SERPL-MCNC: 34.6 NG/ML (ref 30–100)
ANION GAP SERPL CALCULATED.3IONS-SCNC: 9 MMOL/L (ref 5–15)
BUN SERPL-MCNC: 26 MG/DL (ref 8–23)
BUN/CREAT SERPL: 22.2 (ref 7–25)
CA-I SERPL ISE-MCNC: 1.23 MMOL/L (ref 1.15–1.35)
CALCIUM SPEC-SCNC: 9.4 MG/DL (ref 8.6–10.5)
CHLORIDE SERPL-SCNC: 105 MMOL/L (ref 98–107)
CK SERPL-CCNC: 130 U/L (ref 20–200)
CO2 SERPL-SCNC: 26 MMOL/L (ref 22–29)
CREAT SERPL-MCNC: 1.17 MG/DL (ref 0.76–1.27)
EGFRCR SERPLBLD CKD-EPI 2021: 64.2 ML/MIN/1.73
ERYTHROCYTE [SEDIMENTATION RATE] IN BLOOD: 11 MM/HR (ref 0–20)
FOLATE SERPL-MCNC: 10 NG/ML (ref 4.78–24.2)
GLUCOSE SERPL-MCNC: 84 MG/DL (ref 65–99)
POTASSIUM SERPL-SCNC: 5.8 MMOL/L (ref 3.5–5.2)
SODIUM SERPL-SCNC: 140 MMOL/L (ref 136–145)
VIT B12 BLD-MCNC: 684 PG/ML (ref 211–946)

## 2024-11-14 PROCEDURE — 36415 COLL VENOUS BLD VENIPUNCTURE: CPT | Performed by: STUDENT IN AN ORGANIZED HEALTH CARE EDUCATION/TRAINING PROGRAM

## 2024-11-14 PROCEDURE — 80048 BASIC METABOLIC PNL TOTAL CA: CPT | Performed by: STUDENT IN AN ORGANIZED HEALTH CARE EDUCATION/TRAINING PROGRAM

## 2024-11-14 PROCEDURE — 82746 ASSAY OF FOLIC ACID SERUM: CPT | Performed by: STUDENT IN AN ORGANIZED HEALTH CARE EDUCATION/TRAINING PROGRAM

## 2024-11-14 PROCEDURE — 1159F MED LIST DOCD IN RCRD: CPT | Performed by: STUDENT IN AN ORGANIZED HEALTH CARE EDUCATION/TRAINING PROGRAM

## 2024-11-14 PROCEDURE — 82330 ASSAY OF CALCIUM: CPT | Performed by: STUDENT IN AN ORGANIZED HEALTH CARE EDUCATION/TRAINING PROGRAM

## 2024-11-14 PROCEDURE — 82550 ASSAY OF CK (CPK): CPT | Performed by: STUDENT IN AN ORGANIZED HEALTH CARE EDUCATION/TRAINING PROGRAM

## 2024-11-14 PROCEDURE — 82306 VITAMIN D 25 HYDROXY: CPT | Performed by: STUDENT IN AN ORGANIZED HEALTH CARE EDUCATION/TRAINING PROGRAM

## 2024-11-14 PROCEDURE — 85652 RBC SED RATE AUTOMATED: CPT | Performed by: STUDENT IN AN ORGANIZED HEALTH CARE EDUCATION/TRAINING PROGRAM

## 2024-11-14 PROCEDURE — 1160F RVW MEDS BY RX/DR IN RCRD: CPT | Performed by: STUDENT IN AN ORGANIZED HEALTH CARE EDUCATION/TRAINING PROGRAM

## 2024-11-14 PROCEDURE — 99214 OFFICE O/P EST MOD 30 MIN: CPT | Performed by: STUDENT IN AN ORGANIZED HEALTH CARE EDUCATION/TRAINING PROGRAM

## 2024-11-14 PROCEDURE — 82607 VITAMIN B-12: CPT | Performed by: STUDENT IN AN ORGANIZED HEALTH CARE EDUCATION/TRAINING PROGRAM

## 2024-11-14 RX ORDER — DEXTROAMPHETAMINE SACCHARATE, AMPHETAMINE ASPARTATE, DEXTROAMPHETAMINE SULFATE AND AMPHETAMINE SULFATE 5; 5; 5; 5 MG/1; MG/1; MG/1; MG/1
20 TABLET ORAL 3 TIMES DAILY
Qty: 90 TABLET | Refills: 0 | Status: CANCELLED | OUTPATIENT
Start: 2024-11-14

## 2024-11-14 RX ORDER — TIZANIDINE 2 MG/1
2 TABLET ORAL NIGHTLY PRN
Qty: 30 TABLET | Refills: 2 | Status: SHIPPED | OUTPATIENT
Start: 2024-11-14

## 2024-11-14 NOTE — PROGRESS NOTES
Office Note     Name: Nicholas Post    : 1945     MRN: [id number]     Chief Complaint  Results (Discuss lab results /)    Subjective     History of Present Illness:  Nicholas Post is a 79 y.o. male who presents today for     ADHD  on Adderall 20mg three times daily for ADHD.    He has been on regimen since   Self employed, was having difficulties with concentration with work.  Affected his personal life, would have difficulties with multiple projections. Would never complete.    HTN  Controlled in office 118/74  Currently taking Prinzide 10-12.5mg  No headaches, no chest pain, no shortness of breath, no dizziness, no syncope.    Had Dexa Scan shows osteo on the left femoral neck and osteopenia of the right femoral neck.   Has been dealing with muscle cramps of the lower extremities, state he is hydrating enough during the day, he is on his feet a lot doing yard work home actives.     Review of Systems:   Review of Systems   Musculoskeletal:  Positive for arthralgias, joint swelling and myalgias. Negative for gait problem.   All other systems reviewed and are negative.      Past Medical History:   Past Medical History:   Diagnosis Date    Back pain     GERD (gastroesophageal reflux disease)     Herpes     Hypertension     Osteoarthritis     Wears reading eyeglasses        Past Surgical History:   Past Surgical History:   Procedure Laterality Date    BACK SURGERY      Lumbar discectomy-      COLONOSCOPY      HAND SURGERY Left     pin    HERNIA REPAIR      KNEE ARTHROSCOPY Left     LUMBAR LAMINECTOMY WITH FUSION N/A 9/3/2020    Procedure: LUMBAR FUSION DECOMPRESSON WITH PEDICLE SCREWS L4-5;  Surgeon: Campbell Murillo MD;  Location: [facility];  Service: Neurosurgery;  Laterality: N/A;    ROTATOR CUFF REPAIR Bilateral     WISDOM TOOTH EXTRACTION         Family History:   Family History   Problem Relation Age of Onset    Cancer Mother     Stroke Mother     Heart attack Father         Social History:   Social History     Socioeconomic History    Marital status:    Tobacco Use    Smoking status: Never    Smokeless tobacco: Never   Vaping Use    Vaping status: Never Used   Substance and Sexual Activity    Alcohol use: Yes     Comment: 7 drinks weekly     Drug use: Yes     Types: Marijuana     Comment: occasional, last use 9-1-20    Sexual activity: Defer       Immunizations:   Immunization History   Administered Date(s) Administered    COVID-19 (PFIZER) Purple Cap Monovalent 11/01/2021        Medications:     Current Outpatient Medications:     Calcium Citrate-Vitamin D 250-2.5 MG-MCG per tablet, Take 1 tablet by mouth 2 (Two) Times a Day., Disp: 180 tablet, Rfl: 1    lisinopril-hydrochlorothiazide (PRINZIDE,ZESTORETIC) 10-12.5 MG per tablet, Take 1 tablet by mouth Daily., Disp: 90 tablet, Rfl: 3    meloxicam (MOBIC) 15 MG tablet, Take 1 tablet by mouth Daily., Disp: , Rfl:     omeprazole (priLOSEC) 40 MG capsule, Take 1 capsule by mouth Daily., Disp: 90 capsule, Rfl: 1    sildenafil (Viagra) 100 MG tablet, Take 1 tablet by mouth Daily As Needed for Erectile Dysfunction., Disp: 90 tablet, Rfl: 3    traZODone (DESYREL) 300 MG tablet, Take 1 tablet by mouth Every Night., Disp: 90 tablet, Rfl: 0    amphetamine-dextroamphetamine (Adderall) 20 MG tablet, Take 1 tablet by mouth 3 (Three) Times a Day., Disp: 90 tablet, Rfl: 0    betamethasone dipropionate (DIPROSONE) 0.05 % cream, Apply 1 Application topically to the appropriate area as directed 2 (Two) Times a Day. (Patient not taking: Reported on 11/14/2024), Disp: 45 g, Rfl: 0    Diclofenac Sodium (VOLTAREN) 1 % gel gel, APPLY 4 GRAMS TOPICALLY AS DIRECTED 4 TIMES A DAY AS NEEDED (ARTHRITIS). (Patient not taking: Reported on 11/14/2024), Disp: 300 g, Rfl: 2    tiZANidine (ZANAFLEX) 2 MG tablet, Take 1 tablet by mouth At Night As Needed for Muscle Spasms., Disp: 30 tablet, Rfl: 2    triamcinolone (KENALOG) 0.1 % ointment, Apply 1  "Application topically to the appropriate area as directed Daily As Needed for Rash or Irritation. (Patient not taking: Reported on 11/14/2024), Disp: 80 g, Rfl: 0    Allergies:   Allergies   Allergen Reactions    Poison Ivy Extract Rash       Objective     Vital Signs  /70   Pulse 55   Temp 97.7 °F (36.5 °C) (Temporal)   Ht 179.1 cm (70.51\")   Wt 82.2 kg (181 lb 3.2 oz)   SpO2 95%   BMI 25.62 kg/m²   Estimated body mass index is 25.62 kg/m² as calculated from the following:    Height as of this encounter: 179.1 cm (70.51\").    Weight as of this encounter: 82.2 kg (181 lb 3.2 oz).            Physical Exam  Constitutional:       Appearance: Normal appearance.   Cardiovascular:      Rate and Rhythm: Normal rate and regular rhythm.      Pulses: Normal pulses.      Heart sounds: Normal heart sounds.   Pulmonary:      Effort: Pulmonary effort is normal.      Breath sounds: Normal breath sounds.   Musculoskeletal:      Right lower leg: No edema.      Left lower leg: No edema.   Neurological:      Mental Status: He is alert.          Result Review :              DEXA Bone Density Axial [[id number]] (Order [id number])  Order  Status: Final result     Study Notes     María Hardin R.T.(R) on 10/18/2024 10:09 AM EDT   Baseline ribs-fingers-feet & toe fx's  RA  calcium vit-d     Appointment Information    PACS Images     Radiology Images  Study Result    Narrative & Impression   DUAL-ENERGY X-RAY ABSORPTIOMETRY (DXA)     INDICATION: Screening for osteoporosis, prior fracture, rheumatoid arthritis     COMPARISON: There are no equivalent studies available for comparison     PROCEDURE: A DXA scan was performed using a Hologic densitometer.     The lumbar spine L1-L4 was evaluated as well as bilateral total hip.     The T-score compares the patient's bone mineral density with the peak bone mass of young normal patients.  According to criteria established by the World Health Organization, patients with T-scores  between " "1.0 and 2.5 standard deviations BELOW the mean   are osteopenic (low bone mass).  Patients with T-scores EQUAL TO OR GREATER than 2.5 standard deviations below the mean are osteoporotic.     The Z-score compares the patient bone mineral density with age and sex matched peers.  According to the International Society for Clinical Densitometry's 2007 consensus conference:  In women prior to menopause and men less than age 50, Z-scores, not   T-scores are preferred.  A Z-score of -2.0 or lower is defined as \"below the expected range for age\" and a Z-score above -2.0 is \"within the expected range for age.\"  The WHO diagnostic criteria may be applied in women in the menopausal transition.    Osteoporosis cannot be diagnosed in men under age 50 on the basis of BMD alone.     TECHNICAL QUALITY:  The study is of good technical quality.     RESULTS:     Lumbar Spine:  The BMD measured in the L1-L3 region is 1.187 g/cm2.  The average T-score is 1.1.  The Z-score is 2.1.     Total Hip:  The BMD measured at the left total proximal femur is 0.744 g/cm2.  The T-score is -1.9.  The Z-score is -1.0.     Femoral Neck:  The BMD measured at the left femoral neck is 0.556 g/cm2.  The T-score is -2.7.  The Z-score is -1.3.     Total Hip:  The BMD measured at the right total proximal femur is 0.793 g/cm2.  The T-score is -1.6.  The Z-score is -0.7.     Femoral neck: The BMD measured at the right femoral neck is 0.640 g/cm2. The T score is -2.1. The Z score is -0.7.     IMPRESSION:  Osteoporosis of the left femoral neck. Osteopenia of the right femoral neck, total hips bilaterally.     The ten year fracture risk assessment was not calculated because some T-scores were at or below -2.5.     All the treatment decisions require clinical judgment and consideration of individual patient factors, including patient preferences, co-morbidities, previous drug use, risk factors not captured in the FRAX model (frailty, falls, vitamin D deficiency, "   increased bone turnover, interval significant decline in bone density) and possible under or over estimation of fracture risk by FRAX. Approaches to reduce osteoporosis related fracture risk include optimizing calcium and vitamin D status, appropriate   weight bearing exercises and fall-prevention measurements.  The National Osteoporosis Foundation recommends (http://www.nof.org/hcp/practice/practice-and-clinical-guidelines/clinicians-guide) that FDA-approved medical therapies be considered in   postmenopausal women and men aged equal or greater than 50 years with :  a) hip or vertebral (clinical or morphometric) fracture; b) T-score of -2.5 or less at the spine or hip; c) Ten-year fracture probability by FRAX of greater than 3% for hip fracture   of greater than 20% for major osteoporotic fracture.       Secondary causes of bone loss should be evaluated if clinically indicated since the etiology of low BMD cannot be determined by BMD measurement alone.     FOLLOWUP: Consider repeating the study in 2-3 years to reassess the patient's status or sooner if there is some new clinical indication.     INTERVAL CHANGE:   There were no equivalent studies available for comparison.        At this facility, the least significant change in the BMD at the left hip with 95% confidence is 0.167891 gm/cm2 at the hip and 0.503210 g/cm2 at the lumbar spine.     Report dictated by: Padmini Alfonso PA-c       I have personally reviewed this case and agree with the findings above:     Electronically Signed: Nhan Simms MD    10/18/2024 3:01 PM EDT    Workstation ID: [id number]       Assessment and Plan     1. Attention deficit hyperactivity disorder (ADHD), unspecified ADHD type  Symptoms controlled  Continue with Adderrall medication for ADHD  - Compliance Drug Analysis, Ur - Urine, Clean Catch; Future  - Urinalysis With Microscopic - Urine, Clean Catch; Future    2. Arthralgia, unspecified joint    - Compliance Drug Analysis, Ur -  Urine, Clean Catch; Future    3. Muscle cramp    - Sedimentation rate, automated; Future  - Basic metabolic panel; Future  - Calcium, Ionized; Future  - Vitamin B12; Future  - Folate; Future  - tiZANidine (ZANAFLEX) 2 MG tablet; Take 1 tablet by mouth At Night As Needed for Muscle Spasms.  Dispense: 30 tablet; Refill: 2  - Vitamin D,25-Hydroxy; Future  - CK; Future    4. Disorder of bone, unspecified    - Vitamin D,25-Hydroxy; Future    5. Osteoporosis without current pathological fracture, unspecified osteoporosis type  Reviewed DEXA scan  Discussed daily weight bearing activities  Taking Calcium-vitamin D supplementation daily     6. Osteopenia of neck of right femur  Reviewed DEXA scan  Discussed daily weight bearing activities  Taking Calcium-vitamin D supplementation daily     7. Hypertension, unspecified type  Controlled on prinzide       Follow Up  Return in about 3 months (around 2/14/2025).    Campbell Jackson MD  [facility] [address]  [facility]  [address]  [address] [address] [phone]

## 2024-11-18 DIAGNOSIS — F90.9 ATTENTION DEFICIT HYPERACTIVITY DISORDER (ADHD), UNSPECIFIED ADHD TYPE: ICD-10-CM

## 2024-11-18 NOTE — TELEPHONE ENCOUNTER
"Caller: Nicholas Post \"Rajesh\"    Relationship: Self    Best call back number: [phone]     Requested Prescriptions:   Requested Prescriptions     Pending Prescriptions Disp Refills    amphetamine-dextroamphetamine (Adderall) 20 MG tablet 90 tablet 0     Sig: Take 1 tablet by mouth 3 (Three) Times a Day.        Pharmacy where request should be sent: [facility]/PHARMACY #[id number] - [address] [phone] [place] [phone] FX     Last office visit with prescribing clinician: 11/14/2024   Last telemedicine visit with prescribing clinician: Visit date not found   Next office visit with prescribing clinician: 2/14/2025     Additional details provided by patient:     Does the patient have less than a 3 day supply:  [x] Yes  [] No    Would you like a call back once the refill request has been completed: [] Yes [x] No    If the office needs to give you a call back, can they leave a voicemail: [] Yes [x] No    Marialuisa Smith Rep   11/18/24 15:59 EST   "

## 2024-11-18 NOTE — TELEPHONE ENCOUNTER
Rx Refill Note  Requested Prescriptions     Pending Prescriptions Disp Refills    amphetamine-dextroamphetamine (Adderall) 20 MG tablet 90 tablet 0     Sig: Take 1 tablet by mouth 3 (Three) Times a Day.      Last office visit with prescribing clinician: 11/14/2024   Last telemedicine visit with prescribing clinician: Visit date not found   Next office visit with prescribing clinician: 2/14/2025       Gina Brsyon MA  11/18/24, 17:21 EST

## 2024-11-19 RX ORDER — DEXTROAMPHETAMINE SACCHARATE, AMPHETAMINE ASPARTATE, DEXTROAMPHETAMINE SULFATE AND AMPHETAMINE SULFATE 5; 5; 5; 5 MG/1; MG/1; MG/1; MG/1
20 TABLET ORAL 3 TIMES DAILY
Qty: 90 TABLET | Refills: 0 | OUTPATIENT
Start: 2024-11-19

## 2024-12-02 DIAGNOSIS — F90.9 ATTENTION DEFICIT HYPERACTIVITY DISORDER (ADHD), UNSPECIFIED ADHD TYPE: ICD-10-CM

## 2024-12-02 NOTE — TELEPHONE ENCOUNTER
Rx Refill Note  Requested Prescriptions     Pending Prescriptions Disp Refills    amphetamine-dextroamphetamine (Adderall) 20 MG tablet 90 tablet 0     Sig: Take 1 tablet by mouth 3 (Three) Times a Day.      Last office visit with prescribing clinician: 11/14/2024   Last telemedicine visit with prescribing clinician: Visit date not found   Next office visit with prescribing clinician: 2/14/2025       Gina Bryson MA  12/02/24, 17:53 EST

## 2024-12-02 NOTE — TELEPHONE ENCOUNTER
"    Caller: Nicholas Post \"Rajesh\"    Relationship: Self    Best call back number: [phone]   Requested Prescriptions:   Requested Prescriptions     Pending Prescriptions Disp Refills    amphetamine-dextroamphetamine (Adderall) 20 MG tablet 90 tablet 0     Sig: Take 1 tablet by mouth 3 (Three) Times a Day.        Pharmacy where request should be sent: [facility]/PHARMACY #[id number] - [address] AT [facility] - [phone] [facility] [phone] FX     Last office visit with prescribing clinician: 11/14/2024   Last telemedicine visit with prescribing clinician: Visit date not found   Next office visit with prescribing clinician: 2/14/2025     Additional details provided by patient:  OUT OF MEDS, CALL BACK    Does the patient have less than a 3 day supply:  [x] Yes  [] No    Would you like a call back once the refill request has been completed: [x] Yes [] No    If the office needs to give you a call back, can they leave a voicemail: [x] Yes [] No    Marialuisa Manley Rep   12/02/24 15:41 EST       "

## 2024-12-03 RX ORDER — DEXTROAMPHETAMINE SACCHARATE, AMPHETAMINE ASPARTATE, DEXTROAMPHETAMINE SULFATE AND AMPHETAMINE SULFATE 5; 5; 5; 5 MG/1; MG/1; MG/1; MG/1
20 TABLET ORAL 3 TIMES DAILY
Qty: 90 TABLET | Refills: 0 | Status: SHIPPED | OUTPATIENT
Start: 2024-12-03 | End: 2024-12-06 | Stop reason: SDUPTHER

## 2024-12-06 ENCOUNTER — TELEPHONE (OUTPATIENT)
Dept: INTERNAL MEDICINE | Facility: CLINIC | Age: 79
End: 2024-12-06
Payer: MEDICARE

## 2024-12-06 DIAGNOSIS — F90.9 ATTENTION DEFICIT HYPERACTIVITY DISORDER (ADHD), UNSPECIFIED ADHD TYPE: ICD-10-CM

## 2024-12-06 RX ORDER — DEXTROAMPHETAMINE SACCHARATE, AMPHETAMINE ASPARTATE, DEXTROAMPHETAMINE SULFATE AND AMPHETAMINE SULFATE 5; 5; 5; 5 MG/1; MG/1; MG/1; MG/1
20 TABLET ORAL 3 TIMES DAILY
Qty: 90 TABLET | Refills: 0 | Status: SHIPPED | OUTPATIENT
Start: 2024-12-06

## 2024-12-06 NOTE — TELEPHONE ENCOUNTER
"Caller: Nicholas Post \"Rajesh\"    Relationship: Self    Best call back number: [phone]    Which medication are you concerned about:   amphetamine-dextroamphetamine (Adderall) 20 MG tablet     Who prescribed you this medication: DR. MAHER    What are your concerns:   PATIENT STATES THAT PHARMACY NEVER RECEIVED PRESCRIPTION AND HE IS OUT    ADDITIONAL NOTES: PLEASE CALL PATIENT WHEN PRESCRIPTION HAS BEEN SENT    "

## 2025-01-13 DIAGNOSIS — F90.9 ATTENTION DEFICIT HYPERACTIVITY DISORDER (ADHD), UNSPECIFIED ADHD TYPE: ICD-10-CM

## 2025-01-13 NOTE — TELEPHONE ENCOUNTER
Rx Refill Note  Requested Prescriptions     Pending Prescriptions Disp Refills    amphetamine-dextroamphetamine (Adderall) 20 MG tablet 90 tablet 0     Sig: Take 1 tablet by mouth 3 (Three) Times a Day.      Last office visit with prescribing clinician: 11/14/2024   Last telemedicine visit with prescribing clinician: Visit date not found   Next office visit with prescribing clinician: 2/14/2025    CSA: not completed   UDS:ordered 12/8/2024    Jazzmine Mason MA  01/13/25, 14:12 EST

## 2025-01-13 NOTE — TELEPHONE ENCOUNTER
"  Caller: Nicholas Post \"Rajesh\"    Relationship: Self    Best call back number: [phone]     Requested Prescriptions:   Requested Prescriptions     Pending Prescriptions Disp Refills    amphetamine-dextroamphetamine (Adderall) 20 MG tablet 90 tablet 0     Sig: Take 1 tablet by mouth 3 (Three) Times a Day.        Pharmacy where request should be sent: [id number] [place], [place] - [address] AT [facility] [place] [facility] [address] - [phone]  - [phone] FX     Last office visit with prescribing clinician: 11/14/2024   Last telemedicine visit with prescribing clinician: Visit date not found   Next office visit with prescribing clinician: 2/14/2025     Additional details provided by patient: OUT OF MEDICATION     Does the patient have less than a 3 day supply:  [x] Yes  [] No    Would you like a call back once the refill request has been completed: [] Yes [x] No    If the office needs to give you a call back, can they leave a voicemail: [] Yes [x] No    Marialuisa Orr Rep   01/13/25 13:37 EST   "

## 2025-01-14 RX ORDER — DEXTROAMPHETAMINE SACCHARATE, AMPHETAMINE ASPARTATE, DEXTROAMPHETAMINE SULFATE AND AMPHETAMINE SULFATE 5; 5; 5; 5 MG/1; MG/1; MG/1; MG/1
20 TABLET ORAL 3 TIMES DAILY
Qty: 90 TABLET | Refills: 0 | Status: SHIPPED | OUTPATIENT
Start: 2025-01-14

## 2025-01-22 ENCOUNTER — TELEPHONE (OUTPATIENT)
Dept: INTERNAL MEDICINE | Facility: CLINIC | Age: 80
End: 2025-01-22

## 2025-01-22 NOTE — TELEPHONE ENCOUNTER
"Caller: Nicholas Post Eleonora \"Rajesh\"    Relationship: Self    Best call back number: [phone]     What medication are you requesting: ANTIBIOTICS    What are your current symptoms: SORE THROAT, AND COUGH    How long have you been experiencing symptoms: 1 WEEK    Have you had these symptoms before:    [] Yes  [x] No    Have you been treated for these symptoms before:   [] Yes  [x] No    If a prescription is needed, what is your preferred pharmacy and phone number: [facility] #[id number] - [place], [place] - [address] AT [facility] [address] - [phone] [facility] [phone] FX             "

## 2025-01-22 NOTE — TELEPHONE ENCOUNTER
"  Caller: Nicholas Post \"Rajesh\"    Relationship: Self    Best call back number: [phone]       What was the call regarding: PATIENT WENT TO ER, NO LONGER NEEDS ANTIBIOTICS.     "

## 2025-02-14 DIAGNOSIS — F90.9 ATTENTION DEFICIT HYPERACTIVITY DISORDER (ADHD), UNSPECIFIED ADHD TYPE: ICD-10-CM

## 2025-02-14 RX ORDER — DEXTROAMPHETAMINE SACCHARATE, AMPHETAMINE ASPARTATE, DEXTROAMPHETAMINE SULFATE AND AMPHETAMINE SULFATE 5; 5; 5; 5 MG/1; MG/1; MG/1; MG/1
20 TABLET ORAL 3 TIMES DAILY
Qty: 90 TABLET | Refills: 0 | Status: CANCELLED | OUTPATIENT
Start: 2025-02-14

## 2025-02-14 NOTE — TELEPHONE ENCOUNTER
"  Caller: Nicholas Post Yamilkeke \"Rajesh\"    Relationship: Self    Best call back number:[phone]     Requested Prescriptions:   Requested Prescriptions     Pending Prescriptions Disp Refills    amphetamine-dextroamphetamine (Adderall) 20 MG tablet 90 tablet 0     Sig: Take 1 tablet by mouth 3 (Three) Times a Day.        Pharmacy where request should be sent: [facility]/PHARMACY #[id number] - [address] AT [facility] [phone] [facility] [phone]      Last office visit with prescribing clinician: 11/14/2024   Last telemedicine visit with prescribing clinician: Visit date not found   Next office visit with prescribing clinician: Visit date not found       "

## 2025-02-14 NOTE — TELEPHONE ENCOUNTER
LV: 11/14/24  NV: not scheduled  LF: 1/14/25 90/0  CSC: none  UDS: not completed  Left message on voicemail patient is due now for an appointment.

## 2025-02-17 DIAGNOSIS — F90.9 ATTENTION DEFICIT HYPERACTIVITY DISORDER (ADHD), UNSPECIFIED ADHD TYPE: ICD-10-CM

## 2025-02-17 RX ORDER — DEXTROAMPHETAMINE SACCHARATE, AMPHETAMINE ASPARTATE, DEXTROAMPHETAMINE SULFATE AND AMPHETAMINE SULFATE 5; 5; 5; 5 MG/1; MG/1; MG/1; MG/1
20 TABLET ORAL 3 TIMES DAILY
Qty: 45 TABLET | Refills: 0 | Status: SHIPPED | OUTPATIENT
Start: 2025-02-17 | End: 2025-02-20 | Stop reason: SDUPTHER

## 2025-02-17 RX ORDER — DEXTROAMPHETAMINE SACCHARATE, AMPHETAMINE ASPARTATE, DEXTROAMPHETAMINE SULFATE AND AMPHETAMINE SULFATE 5; 5; 5; 5 MG/1; MG/1; MG/1; MG/1
20 TABLET ORAL 3 TIMES DAILY
Qty: 90 TABLET | Refills: 0 | OUTPATIENT
Start: 2025-02-17

## 2025-02-17 NOTE — TELEPHONE ENCOUNTER
"Caller: Nicholas Post \"Rajesh\"    Relationship: Self    Best call back number: [phone]     Requested Prescriptions:   Requested Prescriptions     Pending Prescriptions Disp Refills    amphetamine-dextroamphetamine (Adderall) 20 MG tablet 90 tablet 0     Sig: Take 1 tablet by mouth 3 (Three) Times a Day.        [facility] be [id number] [place], [place] - [address] [facility] [address] - [phone]  - [phone] FX     Last office visit with prescribing clinician: 11/14/2024   Last telemedicine visit with prescribing clinician: Visit date not found   Next office visit with prescribing clinician: 2/20/2025     Additional details provided by patient:     Does the patient have less than a 3 day supply:  [x] Yes  [] No    Would you like a call back once the refill request has been completed: [] Yes [x] No    If the office needs to give you a call back, can they leave a voicemail: [] Yes [x] No    Marialuisa Smith Rep   02/17/25 15:36 EST   "

## 2025-02-20 ENCOUNTER — TELEPHONE (OUTPATIENT)
Dept: INTERNAL MEDICINE | Facility: CLINIC | Age: 80
End: 2025-02-20

## 2025-02-20 ENCOUNTER — HOSPITAL ENCOUNTER (OUTPATIENT)
Dept: GENERAL RADIOLOGY | Facility: HOSPITAL | Age: 80
Discharge: HOME | End: 2025-02-20
Admitting: STUDENT IN AN ORGANIZED HEALTH CARE EDUCATION/TRAINING PROGRAM
Payer: MEDICARE

## 2025-02-20 ENCOUNTER — OFFICE VISIT (OUTPATIENT)
Dept: INTERNAL MEDICINE | Facility: CLINIC | Age: 80
End: 2025-02-20
Payer: MEDICARE

## 2025-02-20 VITALS
OXYGEN SATURATION: 98 % | HEART RATE: 61 BPM | WEIGHT: 184.4 LBS | DIASTOLIC BLOOD PRESSURE: 68 MMHG | TEMPERATURE: 97.1 F | HEIGHT: 71 IN | SYSTOLIC BLOOD PRESSURE: 118 MMHG | BODY MASS INDEX: 25.81 KG/M2

## 2025-02-20 DIAGNOSIS — M81.6 LOCALIZED OSTEOPOROSIS (LEQUESNE): ICD-10-CM

## 2025-02-20 DIAGNOSIS — F90.9 ATTENTION DEFICIT HYPERACTIVITY DISORDER (ADHD), UNSPECIFIED ADHD TYPE: ICD-10-CM

## 2025-02-20 DIAGNOSIS — M25.522 LEFT ELBOW PAIN: ICD-10-CM

## 2025-02-20 DIAGNOSIS — I10 HYPERTENSION, UNSPECIFIED TYPE: ICD-10-CM

## 2025-02-20 DIAGNOSIS — Z13.820 SCREENING FOR OSTEOPOROSIS: ICD-10-CM

## 2025-02-20 DIAGNOSIS — F90.9 ATTENTION DEFICIT HYPERACTIVITY DISORDER (ADHD), UNSPECIFIED ADHD TYPE: Primary | ICD-10-CM

## 2025-02-20 PROCEDURE — 73070 X-RAY EXAM OF ELBOW: CPT

## 2025-02-20 RX ORDER — MELOXICAM 15 MG/1
15 TABLET ORAL DAILY
Status: CANCELLED | OUTPATIENT
Start: 2025-02-20

## 2025-02-20 RX ORDER — MELOXICAM 15 MG/1
15 TABLET ORAL DAILY PRN
Qty: 90 TABLET | Refills: 1 | Status: SHIPPED | OUTPATIENT
Start: 2025-02-20

## 2025-02-20 RX ORDER — PREDNISONE 10 MG/1
10 TABLET ORAL DAILY PRN
Qty: 30 TABLET | Refills: 0 | Status: SHIPPED | OUTPATIENT
Start: 2025-02-20 | End: 2025-03-22

## 2025-02-20 RX ORDER — DEXTROAMPHETAMINE SACCHARATE, AMPHETAMINE ASPARTATE, DEXTROAMPHETAMINE SULFATE AND AMPHETAMINE SULFATE 5; 5; 5; 5 MG/1; MG/1; MG/1; MG/1
20 TABLET ORAL 3 TIMES DAILY
Qty: 90 TABLET | Refills: 0 | Status: SHIPPED | OUTPATIENT
Start: 2025-02-20

## 2025-02-20 RX ORDER — PREDNISONE 10 MG/1
10 TABLET ORAL DAILY PRN
Qty: 30 TABLET | Refills: 0 | Status: SHIPPED | OUTPATIENT
Start: 2025-02-20 | End: 2025-02-20 | Stop reason: SDUPTHER

## 2025-02-20 RX ORDER — DEXTROAMPHETAMINE SACCHARATE, AMPHETAMINE ASPARTATE, DEXTROAMPHETAMINE SULFATE AND AMPHETAMINE SULFATE 5; 5; 5; 5 MG/1; MG/1; MG/1; MG/1
20 TABLET ORAL 3 TIMES DAILY
Qty: 90 TABLET | Refills: 0 | Status: SHIPPED | OUTPATIENT
Start: 2025-02-20 | End: 2025-02-20 | Stop reason: SDUPTHER

## 2025-02-20 NOTE — PROGRESS NOTES
Office Note     Name: Nicholas Post    : 1945     MRN: [id number]     Chief Complaint  Med Management (Would like to switch from mobic to prednisone ) and ADD (Med refill)    Subjective     History of Present Illness:  Nicholas Post is a 80 y.o. male who presents today for \    ADHD  Currently on Adderal 20mg TID    complains of left elbow pain. Onset of the symptoms was about a month ago. Inciting event: patient recall banging it against the side of a hard metal object. Current symptoms include: point tenderness  and swelling. Pain is aggravated by: lifting heavy objects. Symptoms have stabilized. Patient has had prior elbow problems.     HTN  Controlled in office 118/74  Currently taking Prinzide 10-12.5mg  No headaches, no chest pain, no shortness of breath, no dizziness, no syncope.            Review of Systems:   Review of Systems   All other systems reviewed and are negative.      Past Medical History:   Past Medical History:   Diagnosis Date    Back pain     GERD (gastroesophageal reflux disease)     Herpes     Hypertension     Osteoarthritis     Wears reading eyeglasses        Past Surgical History:   Past Surgical History:   Procedure Laterality Date    BACK SURGERY      Lumbar discectomy-      COLONOSCOPY      HAND SURGERY Left     pin    HERNIA REPAIR      KNEE ARTHROSCOPY Left     LUMBAR LAMINECTOMY WITH FUSION N/A 9/3/2020    Procedure: LUMBAR FUSION DECOMPRESSON WITH PEDICLE SCREWS L4-5;  Surgeon: Campbell Murillo MD;  [facility]: [facility];  Service: Neurosurgery;  Laterality: N/A;    ROTATOR CUFF REPAIR Bilateral     WISDOM TOOTH EXTRACTION         Family History:   Family History   Problem Relation Age of Onset    Cancer Mother     Stroke Mother     Heart attack Father        Social History:   Social History     Socioeconomic History    Marital status: Significant Other   Tobacco Use    Smoking status: Never    Smokeless tobacco: Never   Vaping Use    Vaping  status: Never Used   Substance and Sexual Activity    Alcohol use: Yes     Comment: 7 drinks weekly     Drug use: Yes     Types: Marijuana     Comment: occasional, last use 9-1-20    Sexual activity: Defer       Immunizations:   Immunization History   Administered Date(s) Administered    COVID-19 (PFIZER) Purple Cap Monovalent 11/01/2021        Medications:     Current Outpatient Medications:     Calcium Citrate-Vitamin D 250-2.5 MG-MCG per tablet, Take 1 tablet by mouth 2 (Two) Times a Day., Disp: 180 tablet, Rfl: 1    lisinopril-hydrochlorothiazide (PRINZIDE,ZESTORETIC) 10-12.5 MG per tablet, Take 1 tablet by mouth Daily., Disp: 90 tablet, Rfl: 3    sildenafil (Viagra) 100 MG tablet, Take 1 tablet by mouth Daily As Needed for Erectile Dysfunction., Disp: 90 tablet, Rfl: 3    tiZANidine (ZANAFLEX) 2 MG tablet, Take 1 tablet by mouth At Night As Needed for Muscle Spasms., Disp: 30 tablet, Rfl: 2    traZODone (DESYREL) 300 MG tablet, Take 1 tablet by mouth Every Night., Disp: 90 tablet, Rfl: 0    amphetamine-dextroamphetamine (Adderall) 20 MG tablet, Take 1 tablet by mouth 3 (Three) Times a Day., Disp: 90 tablet, Rfl: 0    betamethasone dipropionate (DIPROSONE) 0.05 % cream, Apply 1 Application topically to the appropriate area as directed 2 (Two) Times a Day. (Patient not taking: Reported on 2/20/2025), Disp: 45 g, Rfl: 0    Diclofenac Sodium (VOLTAREN) 1 % gel gel, APPLY 4 GRAMS TOPICALLY AS DIRECTED 4 TIMES A DAY AS NEEDED (ARTHRITIS). (Patient not taking: Reported on 2/20/2025), Disp: 300 g, Rfl: 2    Diclofenac Sodium (VOLTAREN) 1 % gel gel, Apply 4 g topically to the appropriate area as directed 4 (Four) Times a Day As Needed (elbow pain)., Disp: 350 g, Rfl: 0    meloxicam (MOBIC) 15 MG tablet, Take 1 tablet by mouth Daily As Needed for Mild Pain., Disp: 90 tablet, Rfl: 1    omeprazole (priLOSEC) 40 MG capsule, Take 1 capsule by mouth Daily., Disp: 90 capsule, Rfl: 1    predniSONE (DELTASONE) 10 MG tablet, Take  "1 tablet by mouth Daily As Needed (joint pain) for up to 30 days., Disp: 30 tablet, Rfl: 0    triamcinolone (KENALOG) 0.1 % ointment, Apply 1 Application topically to the appropriate area as directed Daily As Needed for Rash or Irritation. (Patient not taking: Reported on 2/20/2025), Disp: 80 g, Rfl: 0    Allergies:   Allergies   Allergen Reactions    Poison Ivy Extract Rash       Objective     Vital Signs  /68   Pulse 61   Temp 97.1 °F (36.2 °C) (Temporal)   Ht 179.1 cm (70.51\")   Wt 83.6 kg (184 lb 6.4 oz)   SpO2 98%   BMI 26.08 kg/m²   Estimated body mass index is 26.08 kg/m² as calculated from the following:    Height as of this encounter: 179.1 cm (70.51\").    Weight as of this encounter: 83.6 kg (184 lb 6.4 oz).            Physical Exam  Constitutional:       Appearance: Normal appearance.   Cardiovascular:      Rate and Rhythm: Normal rate and regular rhythm.      Pulses: Normal pulses.      Heart sounds: Normal heart sounds.   Pulmonary:      Effort: Pulmonary effort is normal.      Breath sounds: Normal breath sounds.   Musculoskeletal:      Left elbow: Swelling present. Decreased range of motion.   Neurological:      Mental Status: He is alert.          Result Review :                  Assessment and Plan     1. Attention deficit hyperactivity disorder (ADHD), unspecified ADHD type  Stable  Continue with Adderall 20mg TID    2. Left elbow pain    - XR ELBOW 2 VIEW LEFT; Future    3. Screening for osteoporosis    - DEXA Bone Density Axial; Future    4. Localized osteoporosis ([place])    - DEXA Bone Density Axial; Future         5. Hypertension, unspecified type  Controlled  Continue with prinzide 10-12.5mg daily         Follow Up  No follow-ups on file.    Campbell Jackson MD  [facility]  [facility]  [address] [facility] [address]  [address] [address] [phone]    "

## 2025-02-20 NOTE — TELEPHONE ENCOUNTER
Patient's medication was sent to [facility] instead of [facility]. Both pharmacies were still listed in his chart.  I have taken out [facility].  Please resend to [facility].

## 2025-02-20 NOTE — TELEPHONE ENCOUNTER
"    Caller: Nicholas Post \"Rajesh\"    Relationship to patient: Self    Best call back number: [phone]     Patient is needing: predniSONE (DELTASONE)     Diclofenac Sodium (VOLTAREN) 1 % gel     amphetamine-dextroamphetamine (Adderall) 20     PLEASE SEND THESE MEDICATIONS TO  [id number] - [place], [place] - [address] AT [facility] [address] - [phone] [facility] [phone] FX   "

## 2025-03-02 DIAGNOSIS — R25.2 MUSCLE CRAMP: ICD-10-CM

## 2025-03-03 RX ORDER — TIZANIDINE 2 MG/1
2 TABLET ORAL NIGHTLY PRN
Qty: 30 TABLET | Refills: 2 | Status: SHIPPED | OUTPATIENT
Start: 2025-03-03

## 2025-03-05 ENCOUNTER — TELEPHONE (OUTPATIENT)
Dept: INTERNAL MEDICINE | Facility: CLINIC | Age: 80
End: 2025-03-05

## 2025-03-05 NOTE — TELEPHONE ENCOUNTER
"  Caller: Nicholas Post \"Rajesh\"    Relationship: Self    Best call back number: [phone]     What is the best time to reach you: LUNCH TIME/ MID DAY    Who are you requesting to speak with (clinical staff, provider,  specific staff member): CLINICAL STAFF    What was the call regarding: PATIENT WOULD LIKE TO HAVE HIS RESULTS EXPLAINED.    PATIENT STATED THAT HE HAS A DOUBLE ORDER OF MEDICATION SENT TO 2 DIFFERENT PHARMACY'S. HE WANTS TO MAKE IT CLEAR THAT HE ONLY DEALS WITH [facility].    "

## 2025-03-06 NOTE — TELEPHONE ENCOUNTER
HUB can relay:  I reviewed the xrays of your elbow, from the report here are mild signs of wear and tear in your elbow joint, which is common as we age. This is similar to arthritis and can sometimes cause pain or stiffness. You also have some bone spurs: which are small growth on the back of the elbow bone, known as an enthesophyte, and some minor changes where a tendon attaches to the bone. These are often related to long-term use or stress on the joint.     The X-ray shows two small, possibly metallic objects in the soft tissue near your forearm. These are superficial, meaning they are near the surface and not deep within the tissue.     It is not really clear exactly what could be contributing to the pain, If the foreign bodies are causing you discomfort or concern, I can have you see the elbow doctor. Let me know what you think
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
## 2025-03-31 DIAGNOSIS — F90.9 ATTENTION DEFICIT HYPERACTIVITY DISORDER (ADHD), UNSPECIFIED ADHD TYPE: ICD-10-CM

## 2025-03-31 RX ORDER — DEXTROAMPHETAMINE SACCHARATE, AMPHETAMINE ASPARTATE, DEXTROAMPHETAMINE SULFATE AND AMPHETAMINE SULFATE 5; 5; 5; 5 MG/1; MG/1; MG/1; MG/1
20 TABLET ORAL 3 TIMES DAILY
Qty: 90 TABLET | Refills: 0 | Status: SHIPPED | OUTPATIENT
Start: 2025-03-31

## 2025-03-31 NOTE — TELEPHONE ENCOUNTER
"Caller: Nicholas Post \"Rajesh\"    Relationship: Self    Best call back number: [phone]     Requested Prescriptions:   Requested Prescriptions     Pending Prescriptions Disp Refills    amphetamine-dextroamphetamine (Adderall) 20 MG tablet 90 tablet 0     Sig: Take 1 tablet by mouth 3 (Three) Times a Day.        Pharm[facility] where [facility] be sent: [id number] [place], [place] - [address] AT [facility] [address] - [phone]  - [phone] FX     Last office visit with prescribing clinician: 2/20/2025   Last telemedicine visit with prescribing clinician: Visit date not found   Next office visit with prescribing clinician: Visit date not found     Additional details provided by patient: PATIENT IS OUT.    Does the patient have less than a 3 day supply:  [x] Yes  [] No    Would you like a call back once the refill request has been completed: [] Yes [x] No    If the office needs to give you a call back, can they leave a voicemail: [] Yes [x] No    Cadance Dunaway, RegSched Rep   03/31/25 10:11 EDT       "

## 2025-03-31 NOTE — TELEPHONE ENCOUNTER
Rx Refill Note  Requested Prescriptions     Pending Prescriptions Disp Refills    amphetamine-dextroamphetamine (Adderall) 20 MG tablet 90 tablet 0     Sig: Take 1 tablet by mouth 3 (Three) Times a Day.      Last office visit with prescribing clinician: 2/20/2025     Next office visit with prescribing clinician: Visit date not found       Mila Zurita  03/31/25, 13:22 EDT      Cimetidine Counseling:  I discussed with the patient the risks of Cimetidine including but not limited to gynecomastia, headache, diarrhea, nausea, drowsiness, arrhythmias, pancreatitis, skin rashes, psychosis, bone marrow suppression and kidney toxicity.

## 2025-04-10 ENCOUNTER — HOSPITAL ENCOUNTER (EMERGENCY)
Facility: HOSPITAL | Age: 80
Discharge: LEFT BEFORE DISCHARGE | End: 2025-04-10
Attending: EMERGENCY MEDICINE
Payer: MEDICARE

## 2025-04-10 VITALS
HEART RATE: 91 BPM | SYSTOLIC BLOOD PRESSURE: 142 MMHG | OXYGEN SATURATION: 97 % | HEIGHT: 72 IN | RESPIRATION RATE: 16 BRPM | WEIGHT: 170 LBS | BODY MASS INDEX: 23.03 KG/M2 | DIASTOLIC BLOOD PRESSURE: 85 MMHG | TEMPERATURE: 98.4 F

## 2025-04-10 DIAGNOSIS — L03.113 CELLULITIS OF RIGHT ELBOW: Primary | ICD-10-CM

## 2025-04-10 DIAGNOSIS — M70.31 BURSITIS OF RIGHT ELBOW, UNSPECIFIED BURSA: ICD-10-CM

## 2025-04-10 LAB
ALBUMIN SERPL-MCNC: 3.8 G/DL (ref 3.5–5.2)
ALBUMIN/GLOB SERPL: 1.2 G/DL
ALP SERPL-CCNC: 73 U/L (ref 39–117)
ALT SERPL W P-5'-P-CCNC: 31 U/L (ref 1–41)
ANION GAP SERPL CALCULATED.3IONS-SCNC: 10 MMOL/L (ref 5–15)
APPEARANCE FLD: ABNORMAL
APPEARANCE FLD: ABNORMAL
AST SERPL-CCNC: 28 U/L (ref 1–40)
BASOPHILS # BLD AUTO: 0.03 10*3/MM3 (ref 0–0.2)
BASOPHILS NFR BLD AUTO: 0.3 % (ref 0–1.5)
BILIRUB SERPL-MCNC: 0.5 MG/DL (ref 0–1.2)
BUN SERPL-MCNC: 18 MG/DL (ref 8–23)
BUN/CREAT SERPL: 20.2 (ref 7–25)
CALCIUM SPEC-SCNC: 9.2 MG/DL (ref 8.6–10.5)
CHLORIDE SERPL-SCNC: 107 MMOL/L (ref 98–107)
CO2 SERPL-SCNC: 22 MMOL/L (ref 22–29)
COLOR FLD: ABNORMAL
COLOR FLD: ABNORMAL
CREAT SERPL-MCNC: 0.89 MG/DL (ref 0.76–1.27)
CRP SERPL-MCNC: 18.81 MG/DL (ref 0–0.5)
CRYSTALS FLD MICRO: NORMAL
CRYSTALS FLD MICRO: NORMAL
D-LACTATE SERPL-SCNC: 1.2 MMOL/L (ref 0.5–2)
DEPRECATED RDW RBC AUTO: 46.6 FL (ref 37–54)
EGFRCR SERPLBLD CKD-EPI 2021: 87.7 ML/MIN/1.73
EOSINOPHIL # BLD AUTO: 0.1 10*3/MM3 (ref 0–0.4)
EOSINOPHIL NFR BLD AUTO: 0.9 % (ref 0.3–6.2)
EOSINOPHIL NFR FLD MANUAL: 2 %
ERYTHROCYTE [DISTWIDTH] IN BLOOD BY AUTOMATED COUNT: 14.5 % (ref 12.3–15.4)
ERYTHROCYTE [SEDIMENTATION RATE] IN BLOOD: 79 MM/HR (ref 0–20)
GLOBULIN UR ELPH-MCNC: 3.3 GM/DL
GLUCOSE SERPL-MCNC: 107 MG/DL (ref 65–99)
HCT VFR BLD AUTO: 42.3 % (ref 37.5–51)
HGB BLD-MCNC: 13.8 G/DL (ref 13–17.7)
IMM GRANULOCYTES # BLD AUTO: 0.04 10*3/MM3 (ref 0–0.05)
IMM GRANULOCYTES NFR BLD AUTO: 0.4 % (ref 0–0.5)
LYMPHOCYTES # BLD AUTO: 0.74 10*3/MM3 (ref 0.7–3.1)
LYMPHOCYTES NFR BLD AUTO: 6.8 % (ref 19.6–45.3)
LYMPHOCYTES NFR FLD MANUAL: 9 %
MACROPHAGE FLUID %: 4 %
MCH RBC QN AUTO: 29 PG (ref 26.6–33)
MCHC RBC AUTO-ENTMCNC: 32.6 G/DL (ref 31.5–35.7)
MCV RBC AUTO: 88.9 FL (ref 79–97)
MONOCYTES # BLD AUTO: 0.81 10*3/MM3 (ref 0.1–0.9)
MONOCYTES NFR BLD AUTO: 7.4 % (ref 5–12)
MONOCYTES NFR FLD: 5 %
NEUTROPHILS NFR BLD AUTO: 84.2 % (ref 42.7–76)
NEUTROPHILS NFR BLD AUTO: 9.18 10*3/MM3 (ref 1.7–7)
NEUTROPHILS NFR FLD MANUAL: 84 %
NEUTROPHILS NFR FLD MANUAL: 96 %
NRBC BLD AUTO-RTO: 0 /100 WBC (ref 0–0.2)
PLATELET # BLD AUTO: 227 10*3/MM3 (ref 140–450)
PMV BLD AUTO: 10.4 FL (ref 6–12)
POTASSIUM SERPL-SCNC: 4.2 MMOL/L (ref 3.5–5.2)
PROCALCITONIN SERPL-MCNC: 0.12 NG/ML (ref 0–0.25)
PROT SERPL-MCNC: 7.1 G/DL (ref 6–8.5)
RBC # BLD AUTO: 4.76 10*6/MM3 (ref 4.14–5.8)
RBC # FLD AUTO: ABNORMAL /MM3
RBC # FLD AUTO: ABNORMAL /MM3
SODIUM SERPL-SCNC: 139 MMOL/L (ref 136–145)
WBC # FLD AUTO: 4256 /MM3
WBC # FLD AUTO: ABNORMAL /MM3
WBC NRBC COR # BLD AUTO: 10.9 10*3/MM3 (ref 3.4–10.8)

## 2025-04-10 PROCEDURE — 82945 GLUCOSE OTHER FLUID: CPT | Performed by: PHYSICIAN ASSISTANT

## 2025-04-10 PROCEDURE — 99283 EMERGENCY DEPT VISIT LOW MDM: CPT

## 2025-04-10 PROCEDURE — 87186 SC STD MICRODIL/AGAR DIL: CPT | Performed by: PHYSICIAN ASSISTANT

## 2025-04-10 PROCEDURE — 86140 C-REACTIVE PROTEIN: CPT | Performed by: PHYSICIAN ASSISTANT

## 2025-04-10 PROCEDURE — 84145 PROCALCITONIN (PCT): CPT | Performed by: PHYSICIAN ASSISTANT

## 2025-04-10 PROCEDURE — 87147 CULTURE TYPE IMMUNOLOGIC: CPT | Performed by: PHYSICIAN ASSISTANT

## 2025-04-10 PROCEDURE — 89060 EXAM SYNOVIAL FLUID CRYSTALS: CPT | Performed by: PHYSICIAN ASSISTANT

## 2025-04-10 PROCEDURE — 83605 ASSAY OF LACTIC ACID: CPT | Performed by: PHYSICIAN ASSISTANT

## 2025-04-10 PROCEDURE — 87205 SMEAR GRAM STAIN: CPT | Performed by: PHYSICIAN ASSISTANT

## 2025-04-10 PROCEDURE — 80053 COMPREHEN METABOLIC PANEL: CPT | Performed by: PHYSICIAN ASSISTANT

## 2025-04-10 PROCEDURE — 89051 BODY FLUID CELL COUNT: CPT | Performed by: PHYSICIAN ASSISTANT

## 2025-04-10 PROCEDURE — 85025 COMPLETE CBC W/AUTO DIFF WBC: CPT | Performed by: PHYSICIAN ASSISTANT

## 2025-04-10 PROCEDURE — 87070 CULTURE OTHR SPECIMN AEROBIC: CPT | Performed by: PHYSICIAN ASSISTANT

## 2025-04-10 PROCEDURE — 84157 ASSAY OF PROTEIN OTHER: CPT | Performed by: PHYSICIAN ASSISTANT

## 2025-04-10 PROCEDURE — 85652 RBC SED RATE AUTOMATED: CPT | Performed by: PHYSICIAN ASSISTANT

## 2025-04-10 RX ORDER — DOXYCYCLINE 100 MG/1
100 CAPSULE ORAL 2 TIMES DAILY
Qty: 14 CAPSULE | Refills: 0 | Status: SHIPPED | OUTPATIENT
Start: 2025-04-10 | End: 2025-04-17
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
## 2025-04-11 ENCOUNTER — RESULTS FOLLOW-UP (OUTPATIENT)
Dept: EMERGENCY DEPT | Facility: HOSPITAL | Age: 80
End: 2025-04-11
Payer: MEDICARE

## 2025-04-11 ENCOUNTER — HOSPITAL ENCOUNTER (EMERGENCY)
Facility: HOSPITAL | Age: 80
Discharge: HOME | End: 2025-04-11
Attending: EMERGENCY MEDICINE
Payer: MEDICARE

## 2025-04-11 VITALS
HEIGHT: 72 IN | WEIGHT: 175 LBS | SYSTOLIC BLOOD PRESSURE: 142 MMHG | OXYGEN SATURATION: 96 % | BODY MASS INDEX: 23.7 KG/M2 | DIASTOLIC BLOOD PRESSURE: 81 MMHG | RESPIRATION RATE: 18 BRPM | HEART RATE: 73 BPM | TEMPERATURE: 98.7 F

## 2025-04-11 DIAGNOSIS — M70.31 BURSITIS OF RIGHT ELBOW, UNSPECIFIED BURSA: ICD-10-CM

## 2025-04-11 DIAGNOSIS — B95.8 STAPH INFECTION: ICD-10-CM

## 2025-04-11 DIAGNOSIS — L03.113 CELLULITIS OF RIGHT ELBOW: Primary | ICD-10-CM

## 2025-04-11 LAB
ALBUMIN SERPL-MCNC: 3.5 G/DL (ref 3.5–5.2)
ALBUMIN/GLOB SERPL: 1.1 G/DL
ALP SERPL-CCNC: 65 U/L (ref 39–117)
ALT SERPL W P-5'-P-CCNC: 28 U/L (ref 1–41)
ANION GAP SERPL CALCULATED.3IONS-SCNC: 10 MMOL/L (ref 5–15)
AST SERPL-CCNC: 25 U/L (ref 1–40)
BASOPHILS # BLD AUTO: 0.03 10*3/MM3 (ref 0–0.2)
BASOPHILS NFR BLD AUTO: 0.3 % (ref 0–1.5)
BILIRUB SERPL-MCNC: 0.4 MG/DL (ref 0–1.2)
BUN SERPL-MCNC: 17 MG/DL (ref 8–23)
BUN/CREAT SERPL: 21 (ref 7–25)
CALCIUM SPEC-SCNC: 9.3 MG/DL (ref 8.6–10.5)
CHLORIDE SERPL-SCNC: 101 MMOL/L (ref 98–107)
CO2 SERPL-SCNC: 25 MMOL/L (ref 22–29)
CREAT SERPL-MCNC: 0.81 MG/DL (ref 0.76–1.27)
D-LACTATE SERPL-SCNC: 0.8 MMOL/L (ref 0.5–2)
DEPRECATED RDW RBC AUTO: 46.8 FL (ref 37–54)
EGFRCR SERPLBLD CKD-EPI 2021: 90.2 ML/MIN/1.73
EOSINOPHIL # BLD AUTO: 0.12 10*3/MM3 (ref 0–0.4)
EOSINOPHIL NFR BLD AUTO: 1.2 % (ref 0.3–6.2)
ERYTHROCYTE [DISTWIDTH] IN BLOOD BY AUTOMATED COUNT: 14.4 % (ref 12.3–15.4)
GLOBULIN UR ELPH-MCNC: 3.1 GM/DL
GLUCOSE FLD-MCNC: 104 MG/DL
GLUCOSE FLD-MCNC: <2 MG/DL
GLUCOSE SERPL-MCNC: 95 MG/DL (ref 65–99)
HCT VFR BLD AUTO: 41.7 % (ref 37.5–51)
HGB BLD-MCNC: 13.3 G/DL (ref 13–17.7)
IMM GRANULOCYTES # BLD AUTO: 0.03 10*3/MM3 (ref 0–0.05)
IMM GRANULOCYTES NFR BLD AUTO: 0.3 % (ref 0–0.5)
LYMPHOCYTES # BLD AUTO: 1.05 10*3/MM3 (ref 0.7–3.1)
LYMPHOCYTES NFR BLD AUTO: 10.1 % (ref 19.6–45.3)
MCH RBC QN AUTO: 28.4 PG (ref 26.6–33)
MCHC RBC AUTO-ENTMCNC: 31.9 G/DL (ref 31.5–35.7)
MCV RBC AUTO: 89.1 FL (ref 79–97)
MONOCYTES # BLD AUTO: 0.97 10*3/MM3 (ref 0.1–0.9)
MONOCYTES NFR BLD AUTO: 9.4 % (ref 5–12)
NEUTROPHILS NFR BLD AUTO: 78.7 % (ref 42.7–76)
NEUTROPHILS NFR BLD AUTO: 8.17 10*3/MM3 (ref 1.7–7)
NRBC BLD AUTO-RTO: 0 /100 WBC (ref 0–0.2)
PLATELET # BLD AUTO: 257 10*3/MM3 (ref 140–450)
PMV BLD AUTO: 10.5 FL (ref 6–12)
POTASSIUM SERPL-SCNC: 4.1 MMOL/L (ref 3.5–5.2)
PROT FLD-MCNC: 2.1 G/DL
PROT FLD-MCNC: 4.5 G/DL
PROT SERPL-MCNC: 6.6 G/DL (ref 6–8.5)
RBC # BLD AUTO: 4.68 10*6/MM3 (ref 4.14–5.8)
SODIUM SERPL-SCNC: 136 MMOL/L (ref 136–145)
WBC NRBC COR # BLD AUTO: 10.37 10*3/MM3 (ref 3.4–10.8)

## 2025-04-11 PROCEDURE — 85025 COMPLETE CBC W/AUTO DIFF WBC: CPT | Performed by: PHYSICIAN ASSISTANT

## 2025-04-11 PROCEDURE — 87040 BLOOD CULTURE FOR BACTERIA: CPT | Performed by: PHYSICIAN ASSISTANT

## 2025-04-11 PROCEDURE — 80053 COMPREHEN METABOLIC PANEL: CPT | Performed by: PHYSICIAN ASSISTANT

## 2025-04-11 PROCEDURE — 99283 EMERGENCY DEPT VISIT LOW MDM: CPT

## 2025-04-11 PROCEDURE — 83605 ASSAY OF LACTIC ACID: CPT | Performed by: PHYSICIAN ASSISTANT

## 2025-04-11 PROCEDURE — 36415 COLL VENOUS BLD VENIPUNCTURE: CPT

## 2025-04-11 NOTE — TELEPHONE ENCOUNTER
I had a good conversation with patient.  I discussed his positive culture of his bursa fluid with concern of an infected joint.  He is taking his oral antibiotics as prescribed.  I strongly recommended the patient return to the emergency department for further evaluation and most likely admission to the hospital as this can cause detrimental damage to his joint.  He tells me that he absolutely cannot come into the hospital he understands my concerns and the understanding that he could lose the function of his arm with worsening infection and disability etc.  Overall he was thankful for my call.  He will continue to take the antibiotics.  He verbalized understanding that him not returning to the hospital is against my medical advice for repeat evaluation.  I encouraged him to follow-up with orthopedics primary care or his oncologist for further evaluation.  He was thankful for the call and he verbalized understanding but once again he is refusing to return to the hospital despite my multiple recommendations concern for septic joint.  Advised him to call or return anytime for any questions.

## 2025-04-12 LAB
BACTERIA FLD CULT: ABNORMAL
GRAM STN SPEC: ABNORMAL
GRAM STN SPEC: ABNORMAL
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
## 2025-04-12 NOTE — DISCHARGE INSTRUCTIONS
Symptomatic care is recommended with Tylenol and/or ibuprofen as needed for pain. Take all medications as prescribed and instructed.  Take and complete full course of antibiotics as prescribed.  Follow up with primary care as directed or return to Emergency Department with worsening of symptoms.

## 2025-04-13 DIAGNOSIS — M25.522 LEFT ELBOW PAIN: ICD-10-CM

## 2025-04-15 LAB
BACTERIA FLD CULT: NORMAL
GRAM STN SPEC: NORMAL

## 2025-04-16 LAB
BACTERIA SPEC AEROBE CULT: NORMAL
BACTERIA SPEC AEROBE CULT: NORMAL

## 2025-04-16 RX ORDER — PREDNISONE 10 MG/1
10 TABLET ORAL DAILY PRN
Qty: 30 TABLET | Refills: 0 | Status: SHIPPED | OUTPATIENT
Start: 2025-04-16 | End: 2025-05-16

## 2025-04-16 NOTE — TELEPHONE ENCOUNTER
LV: 2/20/25  NV: not scheduled  LF: 2/20/25  LL: 4/10/25  Is this something he needs an appointment for?

## 2025-04-22 DIAGNOSIS — G47.00 INSOMNIA, UNSPECIFIED TYPE: ICD-10-CM

## 2025-04-22 RX ORDER — TRAZODONE HYDROCHLORIDE 300 MG/1
300 TABLET ORAL NIGHTLY
Qty: 90 TABLET | Refills: 0 | Status: SHIPPED | OUTPATIENT
Start: 2025-04-22

## 2025-04-22 NOTE — TELEPHONE ENCOUNTER
"Caller: Nicholas Post \"Rajesh\"    Relationship: Self    Best call back number: [phone]     Requested Prescriptions:   Requested Prescriptions     Pending Prescriptions Disp Refills    traZODone (DESYREL) 300 MG tablet 90 tablet 0     Sig: Take 1 tablet by mouth Every Night.        Pharmacy where request should be sent: [id number] [place] [address] [address] [facility] [address] - [phone]  - [phone] FX     Last office visit with prescribing clinician: 2/20/2025   Last telemedicine visit with prescribing clinician: Visit date not found   Next office visit with prescribing clinician: Visit date not found     Additional details provided by patient: PATIENT STATES HE IS COMPLETELY OUT     Does the patient have less than a 3 day supply:  [x] Yes  [] No    Would you like a call back once the refill request has been completed: [] Yes [x] No    If the office needs to give you a call back, can they leave a voicemail: [] Yes [x] No    Marialuisa Ponce Rep   04/22/25 15:07 EDT         "

## 2025-05-08 DIAGNOSIS — G47.00 INSOMNIA, UNSPECIFIED TYPE: ICD-10-CM

## 2025-05-08 RX ORDER — TRAZODONE HYDROCHLORIDE 300 MG/1
300 TABLET ORAL NIGHTLY
Qty: 90 TABLET | Refills: 0 | OUTPATIENT
Start: 2025-05-08

## 2025-05-12 ENCOUNTER — OFFICE VISIT (OUTPATIENT)
Dept: INTERNAL MEDICINE | Facility: CLINIC | Age: 80
End: 2025-05-12
Payer: MEDICARE

## 2025-05-12 VITALS
TEMPERATURE: 97.7 F | HEART RATE: 71 BPM | SYSTOLIC BLOOD PRESSURE: 136 MMHG | OXYGEN SATURATION: 95 % | DIASTOLIC BLOOD PRESSURE: 80 MMHG | WEIGHT: 173.4 LBS | BODY MASS INDEX: 23.52 KG/M2

## 2025-05-12 DIAGNOSIS — M71.121 INFECTION OF RIGHT OLECRANON BURSA: Primary | ICD-10-CM

## 2025-05-12 DIAGNOSIS — R63.4 WEIGHT LOSS: ICD-10-CM

## 2025-05-12 DIAGNOSIS — R25.2 MUSCLE CRAMPS AT NIGHT: ICD-10-CM

## 2025-05-12 DIAGNOSIS — M25.521 RIGHT ELBOW PAIN: ICD-10-CM

## 2025-05-12 DIAGNOSIS — I10 HYPERTENSION, UNSPECIFIED TYPE: ICD-10-CM

## 2025-05-12 PROCEDURE — 1125F AMNT PAIN NOTED PAIN PRSNT: CPT | Performed by: STUDENT IN AN ORGANIZED HEALTH CARE EDUCATION/TRAINING PROGRAM

## 2025-05-12 PROCEDURE — 1159F MED LIST DOCD IN RCRD: CPT | Performed by: STUDENT IN AN ORGANIZED HEALTH CARE EDUCATION/TRAINING PROGRAM

## 2025-05-12 PROCEDURE — 1160F RVW MEDS BY RX/DR IN RCRD: CPT | Performed by: STUDENT IN AN ORGANIZED HEALTH CARE EDUCATION/TRAINING PROGRAM

## 2025-05-12 PROCEDURE — G2211 COMPLEX E/M VISIT ADD ON: HCPCS | Performed by: STUDENT IN AN ORGANIZED HEALTH CARE EDUCATION/TRAINING PROGRAM

## 2025-05-12 PROCEDURE — 99214 OFFICE O/P EST MOD 30 MIN: CPT | Performed by: STUDENT IN AN ORGANIZED HEALTH CARE EDUCATION/TRAINING PROGRAM

## 2025-05-12 RX ORDER — AMOXICILLIN 500 MG/1
1 CAPSULE ORAL 3 TIMES DAILY
COMMUNITY
Start: 2025-05-02

## 2025-05-12 RX ORDER — BACLOFEN 10 MG/1
10 TABLET ORAL NIGHTLY PRN
Qty: 30 TABLET | Refills: 2 | Status: SHIPPED | OUTPATIENT
Start: 2025-05-12

## 2025-05-12 RX ORDER — DOXYCYCLINE 100 MG/1
1 CAPSULE ORAL EVERY 12 HOURS SCHEDULED
COMMUNITY
Start: 2025-05-04
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
## 2025-05-21 DIAGNOSIS — M25.522 LEFT ELBOW PAIN: ICD-10-CM

## 2025-05-21 RX ORDER — PREDNISONE 10 MG/1
10 TABLET ORAL DAILY PRN
Qty: 30 TABLET | Refills: 0 | OUTPATIENT
Start: 2025-05-21 | End: 2025-06-20

## 2025-05-29 DIAGNOSIS — F90.9 ATTENTION DEFICIT HYPERACTIVITY DISORDER (ADHD), UNSPECIFIED ADHD TYPE: ICD-10-CM

## 2025-05-29 NOTE — TELEPHONE ENCOUNTER
"    Caller: Nicholas Post \"Rajesh\"    Relationship: Self    Best call back number: [phone]     Requested Prescriptions:   Requested Prescriptions     Pending Prescriptions Disp Refills    amphetamine-dextroamphetamine (Adderall) 20 MG tablet 90 tablet 0     Sig: Take 1 tablet by mouth 3 (Three) Times a Day.        Pharmacy where request should be sent: [id number] [place], [place] - [address] [facility] [address] - [phone]  - [phone] FX     Last office visit with prescribing clinician: 5/12/2025   Last telemedicine visit with prescribing clinician: Visit date not found   Next office visit with prescribing clinician: 8/4/2025     Additional details provided by patient:     Does the patient have less than a 3 day supply:  [x] Yes  [] No    Would you like a call back once the refill request has been completed: [] Yes [] No    If the office needs to give you a call back, can they leave a voicemail: [] Yes [] No    Marialuisa Manley Rep   05/29/25 14:31 EDT     "

## 2025-05-30 RX ORDER — DEXTROAMPHETAMINE SACCHARATE, AMPHETAMINE ASPARTATE, DEXTROAMPHETAMINE SULFATE AND AMPHETAMINE SULFATE 5; 5; 5; 5 MG/1; MG/1; MG/1; MG/1
20 TABLET ORAL 3 TIMES DAILY
Qty: 90 TABLET | Refills: 0 | Status: SHIPPED | OUTPATIENT
Start: 2025-05-30

## 2025-05-31 DIAGNOSIS — R25.2 MUSCLE CRAMP: ICD-10-CM

## 2025-06-02 DIAGNOSIS — R25.2 MUSCLE CRAMP: ICD-10-CM

## 2025-06-02 RX ORDER — TIZANIDINE 2 MG/1
2 TABLET ORAL NIGHTLY PRN
Qty: 30 TABLET | Refills: 2 | Status: SHIPPED | OUTPATIENT
Start: 2025-06-02

## 2025-06-02 RX ORDER — MELOXICAM 15 MG/1
15 TABLET ORAL DAILY PRN
Qty: 90 TABLET | Refills: 1 | OUTPATIENT
Start: 2025-06-02

## 2025-06-02 NOTE — TELEPHONE ENCOUNTER
[facility] Refill fax request     Meloxicam   Last refill 2/20/25  Last visit 5/12/25  Next visit  8/4/25 too soon to refill

## 2025-06-10 ENCOUNTER — TELEPHONE (OUTPATIENT)
Dept: INTERNAL MEDICINE | Facility: CLINIC | Age: 80
End: 2025-06-10
Payer: MEDICARE

## 2025-06-10 NOTE — TELEPHONE ENCOUNTER
Left message on voicemail asking patient about why he thinks he needs to go back on the lisinopril.

## 2025-06-10 NOTE — TELEPHONE ENCOUNTER
"    Caller: Nicholas Post Eleonora \"Rajesh\"    Relationship: Self    Best call back number: [phone]     What medication are you requesting: LISINOPRIL      Have you had these symptoms before:    [x] Yes  [] No    Have you been treated for these symptoms before:   [x] Yes  [] No    If a prescription is needed, what is your preferred pharmacy and phone number: [id number] [place], [place] - [address] [facility] [address] - [phone] [facility] [phone]      Additional notes:PATIENT THINKS HE NEEDS TO GO BACK ON THIS MEDICATION          "

## 2025-06-12 RX ORDER — LISINOPRIL 10 MG/1
10 TABLET ORAL DAILY
Qty: 90 TABLET | Refills: 1 | Status: SHIPPED | OUTPATIENT
Start: 2025-06-12

## 2025-06-12 NOTE — TELEPHONE ENCOUNTER
I sent Lisinopril 10mg to his pharmacy, please make sure he checks his BP at home. If the blood pressure is Systolic >140, he can take an extra dose of lisinopril

## 2025-06-12 NOTE — TELEPHONE ENCOUNTER
Patient states his BP has been elevated.  He has not checked it but feels like he needs this.  He has already picked up at the pharmacy.  Can you add it back in to his medication list?

## 2025-06-12 NOTE — TELEPHONE ENCOUNTER
I have left 2 messages asking patient to return call to discuss.  He has not returned phone call.  Do you want to restart lisinopril.

## 2025-07-11 DIAGNOSIS — G47.00 INSOMNIA, UNSPECIFIED TYPE: ICD-10-CM

## 2025-07-11 RX ORDER — TRAZODONE HYDROCHLORIDE 300 MG/1
300 TABLET ORAL NIGHTLY
Qty: 90 TABLET | Refills: 0 | Status: SHIPPED | OUTPATIENT
Start: 2025-07-11

## 2025-07-11 NOTE — TELEPHONE ENCOUNTER
"Caller: Nicholas Post \"Rajesh\"    Relationship: Self    Best call back number: [phone]     Requested Prescriptions:   Requested Prescriptions     Pending Prescriptions Disp Refills    traZODone (DESYREL) 300 MG tablet 90 tablet 0     Sig: Take 1 tablet by mouth Every Night.        Pharmacy where request should be sent: [id number] [place] [id number] [address] AT [facility] [address] - [phone]  - [phone] FX     Last office visit with prescribing clinician: 5/12/2025   Last telemedicine visit with prescribing clinician: Visit date not found   Next office visit with prescribing clinician: 8/4/2025     Additional details provided by patient:PATIENT HAS CALLED REQUESTING A REFILL ON ABOVE MEDICATION.     Does the patient have less than a 3 day supply:  [x] Yes  [] No    Would you like a call back once the refill request has been completed: [] Yes [x] No    If the office needs to give you a call back, can they leave a voicemail: [] Yes [x] No    Calixto Jordan   07/11/25 13:55 EDT           "

## 2025-08-01 DIAGNOSIS — F90.9 ATTENTION DEFICIT HYPERACTIVITY DISORDER (ADHD), UNSPECIFIED ADHD TYPE: ICD-10-CM

## 2025-08-02 DIAGNOSIS — G47.00 INSOMNIA, UNSPECIFIED TYPE: ICD-10-CM

## 2025-08-04 ENCOUNTER — OFFICE VISIT (OUTPATIENT)
Dept: INTERNAL MEDICINE | Facility: CLINIC | Age: 80
End: 2025-08-04
Payer: MEDICARE

## 2025-08-04 ENCOUNTER — TELEPHONE (OUTPATIENT)
Dept: INTERNAL MEDICINE | Facility: CLINIC | Age: 80
End: 2025-08-04

## 2025-08-04 VITALS
TEMPERATURE: 96.8 F | OXYGEN SATURATION: 93 % | BODY MASS INDEX: 24.35 KG/M2 | HEIGHT: 72 IN | HEART RATE: 68 BPM | SYSTOLIC BLOOD PRESSURE: 128 MMHG | WEIGHT: 179.8 LBS | DIASTOLIC BLOOD PRESSURE: 68 MMHG

## 2025-08-04 DIAGNOSIS — R25.2 MUSCLE CRAMPS AT NIGHT: ICD-10-CM

## 2025-08-04 DIAGNOSIS — N52.9 ERECTILE DYSFUNCTION, UNSPECIFIED ERECTILE DYSFUNCTION TYPE: ICD-10-CM

## 2025-08-04 DIAGNOSIS — M13.0 POLYARTHRITIS: Primary | ICD-10-CM

## 2025-08-04 DIAGNOSIS — F90.9 ATTENTION DEFICIT HYPERACTIVITY DISORDER (ADHD), UNSPECIFIED ADHD TYPE: ICD-10-CM

## 2025-08-04 RX ORDER — DICLOFENAC SODIUM 75 MG/1
75 TABLET, DELAYED RELEASE ORAL 2 TIMES DAILY
Qty: 180 TABLET | Refills: 1 | Status: SHIPPED | OUTPATIENT
Start: 2025-08-04

## 2025-08-04 RX ORDER — PREDNISONE 20 MG/1
TABLET ORAL
Qty: 19 TABLET | Refills: 0 | Status: SHIPPED | OUTPATIENT
Start: 2025-08-04 | End: 2025-08-17

## 2025-08-04 RX ORDER — TRIAMCINOLONE ACETONIDE 0.1 %
PASTE (GRAM) DENTAL
COMMUNITY
Start: 2025-06-11

## 2025-08-04 RX ORDER — METHYLPREDNISOLONE 4 MG/1
4 TABLET ORAL DAILY
COMMUNITY
Start: 2025-07-03 | End: 2025-08-04

## 2025-08-04 RX ORDER — DEXTROAMPHETAMINE SACCHARATE, AMPHETAMINE ASPARTATE, DEXTROAMPHETAMINE SULFATE AND AMPHETAMINE SULFATE 5; 5; 5; 5 MG/1; MG/1; MG/1; MG/1
20 TABLET ORAL 3 TIMES DAILY
Qty: 90 TABLET | Refills: 0 | Status: SHIPPED | OUTPATIENT
Start: 2025-08-04

## 2025-08-04 RX ORDER — LISINOPRIL AND HYDROCHLOROTHIAZIDE 10; 12.5 MG/1; MG/1
1 TABLET ORAL DAILY
COMMUNITY
Start: 2025-06-04 | End: 2025-08-04

## 2025-08-04 RX ORDER — BACLOFEN 10 MG/1
10 TABLET ORAL NIGHTLY PRN
Qty: 30 TABLET | Refills: 5 | Status: SHIPPED | OUTPATIENT
Start: 2025-08-04

## 2025-08-04 RX ORDER — SILDENAFIL 100 MG/1
100 TABLET, FILM COATED ORAL DAILY PRN
Qty: 30 TABLET | Refills: 2 | Status: SHIPPED | OUTPATIENT
Start: 2025-08-04

## 2025-08-05 DIAGNOSIS — G47.00 INSOMNIA, UNSPECIFIED TYPE: ICD-10-CM

## 2025-08-05 RX ORDER — TRAZODONE HYDROCHLORIDE 300 MG/1
300 TABLET ORAL NIGHTLY
Qty: 90 TABLET | Refills: 0 | OUTPATIENT
Start: 2025-08-05

## 2025-08-05 RX ORDER — TRAZODONE HYDROCHLORIDE 300 MG/1
300 TABLET ORAL NIGHTLY
Qty: 90 TABLET | Refills: 1 | Status: SHIPPED | OUTPATIENT
Start: 2025-08-05

## 2025-08-11 ENCOUNTER — TELEPHONE (OUTPATIENT)
Dept: INTERNAL MEDICINE | Facility: CLINIC | Age: 80
End: 2025-08-11
Payer: MEDICARE

## 2025-08-14 DIAGNOSIS — N32.81 OVERACTIVE BLADDER: Primary | ICD-10-CM

## 2025-08-21 DIAGNOSIS — Z12.11 SCREEN FOR COLON CANCER: Primary | ICD-10-CM

## 2025-08-26 DIAGNOSIS — R25.2 MUSCLE CRAMP: ICD-10-CM

## 2025-08-26 RX ORDER — TIZANIDINE 2 MG/1
2 TABLET ORAL NIGHTLY PRN
Qty: 30 TABLET | Refills: 2 | Status: SHIPPED | OUTPATIENT
Start: 2025-08-26

## (undated) DEVICE — GOWN,REINF,POLY,ECL,PP SLV,XL: Brand: MEDLINE

## (undated) DEVICE — GLV SURG PREMIERPRO MIC LTX PF SZ7.5 BRN

## (undated) DEVICE — DRAPE,TOP,102X53,STERILE: Brand: MEDLINE

## (undated) DEVICE — TRAP,MUCUS SPECIMEN,40CC: Brand: MEDLINE

## (undated) DEVICE — Device

## (undated) DEVICE — 3M™ STERI-DRAPE™ INSTRUMENT POUCH 1018: Brand: STERI-DRAPE™

## (undated) DEVICE — ANTIBACTERIAL UNDYED BRAIDED (POLYGLACTIN 910), SYNTHETIC ABSORBABLE SUTURE: Brand: COATED VICRYL

## (undated) DEVICE — 3M™ STERI-STRIP™ REINFORCED ADHESIVE SKIN CLOSURES, R1547, 1/2 IN X 4 IN (12 MM X 100 MM), 6 STRIPS/ENVELOPE: Brand: 3M™ STERI-STRIP™

## (undated) DEVICE — GLV SURG PREMIERPRO MIC LTX PF SZ6.5 BRN

## (undated) DEVICE — SNAP KOVER: Brand: UNBRANDED

## (undated) DEVICE — SUT VIC PLS CTD ANTIB BR 3/0 8/18IN 45CM

## (undated) DEVICE — ADHS LIQ MASTISOL 2/3ML

## (undated) DEVICE — SUT ETHLN 3/0 FS1 30IN 669H

## (undated) DEVICE — PK NEURO DISC 10

## (undated) DEVICE — IRRIGATOR BULB ASEPTO 60CC STRL

## (undated) DEVICE — SUT VIC 0 CTD BR 18IN UNDYE VCP724D

## (undated) DEVICE — TOOL 14MH30 LEGEND 14CM 3MM: Brand: MIDAS REX ™

## (undated) DEVICE — 3M™ WARMING BLANKET, UPPER BODY, 10 PER CASE, 42268: Brand: BAIR HUGGER™

## (undated) DEVICE — KT DRN EVAC WND PVC PCH WTROC RND 10F400

## (undated) DEVICE — STRAP POSTN KN/BDY FM 5X72IN DISP

## (undated) DEVICE — SPHR MARKR STEALTH STATION

## (undated) DEVICE — CVR HNDL LIGHT RIGID

## (undated) DEVICE — DRSNG WND GZ PAD BORDERED 4X8IN STRL

## (undated) DEVICE — ELECTRD BLD EXT EDGE/INSUL 1P 4IN

## (undated) DEVICE — ACCY PA700 LUBRICANT DIFFUSER MR7 4 PACK: Brand: MIDAS REX

## (undated) DEVICE — PACK,UNIVERSAL,NO GOWNS: Brand: MEDLINE

## (undated) DEVICE — SHEET,DRAPE,40X58,STERILE: Brand: MEDLINE